# Patient Record
Sex: MALE | Race: WHITE | NOT HISPANIC OR LATINO | Employment: FULL TIME | URBAN - METROPOLITAN AREA
[De-identification: names, ages, dates, MRNs, and addresses within clinical notes are randomized per-mention and may not be internally consistent; named-entity substitution may affect disease eponyms.]

---

## 2017-03-08 DIAGNOSIS — K59.09 OTHER CONSTIPATION: Primary | ICD-10-CM

## 2017-03-08 RX ORDER — POLYETHYLENE GLYCOL 3350 17 G/17G
1 POWDER, FOR SOLUTION ORAL DAILY
Qty: 119 G | Refills: 11 | Status: SHIPPED | OUTPATIENT
Start: 2017-03-08 | End: 2017-08-08

## 2017-03-09 ENCOUNTER — OFFICE VISIT (OUTPATIENT)
Dept: ORTHOPEDICS | Facility: CLINIC | Age: 47
End: 2017-03-09

## 2017-03-09 VITALS
BODY MASS INDEX: 26.64 KG/M2 | SYSTOLIC BLOOD PRESSURE: 128 MMHG | DIASTOLIC BLOOD PRESSURE: 80 MMHG | WEIGHT: 190.3 LBS | HEIGHT: 71 IN

## 2017-03-09 DIAGNOSIS — M76.31 ILIOTIBIAL BAND TENDINITIS OF RIGHT SIDE: Primary | ICD-10-CM

## 2017-03-09 NOTE — MR AVS SNAPSHOT
"              After Visit Summary   3/9/2017    Pantera Haile    MRN: [id number]           Patient Information     Date Of Birth          1970        Visit Information        Provider Department      3/9/2017 2:00 PM Higinio Car MD [facility] Sports Medicine        Today's Diagnoses     Iliotibial band tendinitis of right side    -  1       Follow-ups after your visit        Who to contact     Please call your clinic at [phone] to:    Ask questions about your health    Make or cancel appointments    Discuss your medicines    Learn about your test results    Speak to your doctor   If you have compliments or concerns about an experience at your clinic, or if you wish to file a complaint, please contact [facility] Patient Relations at [phone] or email us at [email or web address]         Additional Information About Your Visit        MyChart Information     [employer] gives you secure access to your electronic health record. If you see a primary care provider, you can also send messages to your care team and make appointments. If you have questions, please call your primary care clinic.  If you do not have a primary care provider, please call [phone] and they will assist you.      [employer] is an electronic gateway that provides easy, online access to your medical records. With [employer], you can request a clinic appointment, read your test results, renew a prescription or communicate with your care team.     To access your existing account, please contact your [facility] or call [phone] for assistance.        Care EveryWhere ID     This is your Care EveryWhere ID. This could be used by other organizations to access your [place] medical records  [id number]        Your Vitals Were     Height BMI (Body Mass Index)                5' 11\" (1.803 m) 26.54 kg/m2           Blood Pressure from Last 3 Encounters:   03/09/17 128/80 "   11/03/16 107/66   09/26/16 117/77    Weight from Last 3 Encounters:   03/09/17 190 lb 4.8 oz (86.3 kg)   11/03/16 196 lb (88.9 kg)   09/26/16 193 lb (87.5 kg)              Today, you had the following     No orders found for display       Primary Care Provider Office Phone # Fax #    Minh Crook [phone]       [facility] [address] [place] [address]        Thank you!     Thank you for choosing [facility]  for your care. Our goal is always to provide you with excellent care. Hearing back from our patients is one way we can continue to improve our services. Please take a few minutes to complete the written survey that you may receive in the mail after your visit with us. Thank you!             Your Updated Medication List - Protect others around you: Learn how to safely use, store and throw away your medicines at www.disposemymeds.org.          This list is accurate as of: 3/9/17 11:59 PM.  Always use your most recent med list.                   Brand Name Dispense Instructions for use    [facility] DIGESTIVE HEALTH Caps     30 capsule    Take 1 capsule by mouth 2 times daily       MAALOX MAX PO          methylcellulose (laxative) Powd     850 g    Start with 1 heaping tablespoon. Increase as needed, 1 heaping tablespoon at a time, up to 3 times per day.       polyethylene glycol powder    MIRALAX/GLYCOLAX    119 g    Take 17 g (1 capful) by mouth daily       propranolol 10 MG tablet    INDERAL    10 tablet    Take one po qd prn for anxiety/tremor

## 2017-03-09 NOTE — PROGRESS NOTES
"Sports Medicine Clinic Visit    PCP: Minh Crook    Pantera Haile is a 46 year old male who is seen  in consultation for left knee pain. He reports 2-3 weeks ago he was running on a treadmill when he all of the sudden felt pain. The pain has been on and off since then.    Injury: 2 weeks ago    Location of Pain: right lower leg  Duration of Pain: 2 week(s)  Rating of Pain: 2/10  Pain is better with: Nothing  Pain is worse with: NA  Additional Features: NA  Treatment so far consists of: Nothing  Prior History of related problems:     /80  Ht 5' 11\" (1.803 m)  Wt 190 lb 4.8 oz (86.3 kg)  BMI 26.54 kg/m2         PMH:  Active problem list:  Patient Active Problem List   Diagnosis     Need for diphtheria-tetanus-pertussis (Tdap) vaccine, adult/adolescent     Adjustment disorder with anxiety       FH:  History reviewed. No pertinent family history.    SH:  Social History     Social History     Marital status: Unknown     Spouse name: N/A     Number of children: N/A     Years of education: N/A     Occupational History     Not on file.     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    MEDS:  See EMR, reviewed  ALL:  See EMR, reviewed    REVIEW OF SYSTEMS:  CONSTITUTIONAL:NEGATIVE for fever, chills, change in weight  INTEGUMENTARY/SKIN: NEGATIVE for worrisome rashes, moles or lesions  EYES: NEGATIVE for vision changes or irritation  ENT/MOUTH: NEGATIVE for ear, mouth and throat problems  RESP:NEGATIVE for significant cough or SOB  BREAST: NEGATIVE for masses, tenderness or discharge  CV: NEGATIVE for chest pain, palpitations or peripheral edema  GI: NEGATIVE for nausea, abdominal pain, heartburn, or change in bowel habits  :NEGATIVE for " frequency, dysuria, or hematuria  :NEGATIVE for frequency, dysuria, or hematuria  NEURO: NEGATIVE for weakness, dizziness or paresthesias  ENDOCRINE: NEGATIVE for temperature intolerance, skin/hair changes  HEME/ALLERGY/IMMUNE: NEGATIVE for bleeding problems  PSYCHIATRIC: NEGATIVE for changes in mood or affect                SUBJECTIVE:  This 46-year-old male 2 weeks ago was running on the treadmill at the [employer] noticed some discomfort he could feel in the lateral side of his right knee.  However, it quickly resolved and he was able to run an additional 5 minutes without any discomfort.  Since then, he has been able to return to running and pain is not always present but sometimes he will notice it with certain squatting and lunging positions.  He points to the lateral side of his knee.  The knee has not been swollen.  It does not lock or catch and he denies any previous issues with this knee.  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      OBJECTIVE:  The right knee reveals no effusion.  I can flex and extend the knee fully.  He is consistently nontender at the lateral joint line or the fibular head.  I can flex the knee into hyperflexion.  Caridad is negative.  Lachman is negative.  Nontender over the medial joint line or the pes anserine bursa.  He has some mild tenderness in the area of the distal IT band at certain angles of motion and he says this is consistent with the areas he has been noticing discomfort.  An Kavita test is negative.  He has improvements that could be made in 1- and 2-legged squat form.      ASSESSMENT:  Distal IT band tendinitis.      PLAN:  I discussed a return progression over the next 2-3 weeks of getting back to his athletic activities without bothering this.  Discuss localized ice massage, friction massage.  We substituted some of his lunging exercises he created on his own to some wall squats.  He was able to demonstrate  these adequately today.  He will look for improvements over the next 3 weeks and follow up if this is not the case.

## 2017-03-09 NOTE — LETTER
"  3/9/2017      RE: Pantera Haile  [address]  [facility] [address]       Sports Medicine Clinic Visit    PCP: Minh Crook    Pantera Haile is a 46 year old male who is seen  in consultation for left knee pain. He reports 2-3 weeks ago he was running on a treadmill when he all of the sudden felt pain. The pain has been on and off since then.    Injury: 2 weeks ago    Location of Pain: right lower leg  Duration of Pain: 2 week(s)  Rating of Pain: 2/10  Pain is better with: Nothing  Pain is worse with: NA  Additional Features: NA  Treatment so far consists of: Nothing  Prior History of related problems:     /80  Ht 5' 11\" (1.803 m)  Wt 190 lb 4.8 oz (86.3 kg)  BMI 26.54 kg/m2         PMH:  Active problem list:  Patient Active Problem List   Diagnosis     Need for diphtheria-tetanus-pertussis (Tdap) vaccine, adult/adolescent     Adjustment disorder with anxiety       FH:  History reviewed. No pertinent family history.    SH:  Social History     Social History     Marital status: Unknown     Spouse name: N/A     Number of children: N/A     Years of education: N/A     Occupational History     Not on file.     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    MEDS:  See EMR, reviewed  ALL:  See EMR, reviewed    REVIEW OF SYSTEMS:  CONSTITUTIONAL:NEGATIVE for fever, chills, change in weight  INTEGUMENTARY/SKIN: NEGATIVE for worrisome rashes, moles or lesions  EYES: NEGATIVE for vision changes or irritation  ENT/MOUTH: NEGATIVE for ear, mouth and throat problems  RESP:NEGATIVE for significant cough or SOB  BREAST: NEGATIVE for masses, tenderness or discharge  CV: NEGATIVE for chest pain, palpitations or peripheral edema  GI: " NEGATIVE for nausea, abdominal pain, heartburn, or change in bowel habits  :NEGATIVE for frequency, dysuria, or hematuria  :NEGATIVE for frequency, dysuria, or hematuria  NEURO: NEGATIVE for weakness, dizziness or paresthesias  ENDOCRINE: NEGATIVE for temperature intolerance, skin/hair changes  HEME/ALLERGY/IMMUNE: NEGATIVE for bleeding problems  PSYCHIATRIC: NEGATIVE for changes in mood or affect                SUBJECTIVE:  This 46-year-old male 2 weeks ago was running on the treadmill at the [employer] noticed some discomfort he could feel in the lateral side of his right knee.  However, it quickly resolved and he was able to run an additional 5 minutes without any discomfort.  Since then, he has been able to return to running and pain is not always present but sometimes he will notice it with certain squatting and lunging positions.  He points to the lateral side of his knee.  The knee has not been swollen.  It does not lock or catch and he denies any previous issues with this knee.  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      OBJECTIVE:  The right knee reveals no effusion.  I can flex and extend the knee fully.  He is consistently nontender at the lateral joint line or the fibular head.  I can flex the knee into hyperflexion.  Caridad is negative.  Lachman is negative.  Nontender over the medial joint line or the pes anserine bursa.  He has some mild tenderness in the area of the distal IT band at certain angles of motion and he says this is consistent with the areas he has been noticing discomfort.  An Kavita test is negative.  He has improvements that could be made in 1- and 2-legged squat form.      ASSESSMENT:  Distal IT band tendinitis.      PLAN:  I discussed a return progression over the next 2-3 weeks of getting back to his athletic activities without bothering this.  Discuss localized ice massage, friction massage.  We substituted some of  his lunging exercises he created on his own to some wall squats.  He was able to demonstrate these adequately today.  He will look for improvements over the next 3 weeks and follow up if this is not the case.       Higinio Car MD

## 2017-07-11 ENCOUNTER — TELEPHONE (OUTPATIENT)
Dept: INTERNAL MEDICINE | Facility: CLINIC | Age: 47
End: 2017-07-11

## 2017-07-11 DIAGNOSIS — L72.3 SEBACEOUS CYST: ICD-10-CM

## 2017-07-11 DIAGNOSIS — T78.40XA ALLERGY: Primary | ICD-10-CM

## 2017-07-12 RX ORDER — CETIRIZINE HYDROCHLORIDE 10 MG/1
10 TABLET ORAL DAILY
Qty: 90 TABLET | Refills: 3 | Status: SHIPPED | OUTPATIENT
Start: 2017-07-12 | End: 2017-08-08

## 2017-07-12 NOTE — TELEPHONE ENCOUNTER
Spoke with pt. 2 things.    1. Pt would like to try a different allergy medication rather than loratidine which is not helping any more -- Dr. Crook suggested Zyrte and pt agreed. I sent the Rx to [facility].    2. Pt would like to see a dermatologist at [facility] for sebaceous cyst. -- referral was faxed to [facility].

## 2017-08-08 ENCOUNTER — OFFICE VISIT (OUTPATIENT)
Dept: FAMILY MEDICINE | Facility: CLINIC | Age: 47
End: 2017-08-08

## 2017-08-08 VITALS
BODY MASS INDEX: 27.34 KG/M2 | WEIGHT: 195.3 LBS | RESPIRATION RATE: 16 BRPM | DIASTOLIC BLOOD PRESSURE: 75 MMHG | HEART RATE: 59 BPM | SYSTOLIC BLOOD PRESSURE: 112 MMHG | HEIGHT: 71 IN

## 2017-08-08 DIAGNOSIS — Z00.00 ROUTINE GENERAL MEDICAL EXAMINATION AT A HEALTH CARE FACILITY: Primary | ICD-10-CM

## 2017-08-08 ASSESSMENT — PAIN SCALES - GENERAL: PAINLEVEL: NO PAIN (0)

## 2017-08-08 NOTE — MR AVS SNAPSHOT
After Visit Summary   8/8/2017    Pantera Haile    MRN: [id number]           Patient Information     Date Of Birth          1970        Visit Information        Provider Department      8/8/2017 11:00 AM Minh Crook MD [facility]        Care Instructions    Primary Care Center Phone Number [phone]  [facility] Medication Refill Request Information:  * Please contact your pharmacy regarding ANY request for medication refills.  ** [facility] Prescription Fax = [phone]  * Please allow 3 business days for routine medication refills.  * Please allow 5 business days for controlled substance medication refills.     [facility] Test Result notification information:  *You will be notified with in 7-10 days of your appointment day regarding the results of your test.  If you are on MyChart you will be notified as soon as the provider has reviewed the results and signed off on them.                  Follow-ups after your visit        Who to contact     Please call your clinic at [phone] to:    Ask questions about your health    Make or cancel appointments    Discuss your medicines    Learn about your test results    Speak to your doctor   If you have compliments or concerns about an experience at your clinic, or if you wish to file a complaint, please contact [facility] Patient Relations at [phone] or email us at [email or web address]         Additional Information About Your Visit        MyChart Information     [employer] gives you secure access to your electronic health record. If you see a primary care provider, you can also send messages to your care team and make appointments. If you have questions, please call your primary care clinic.  If you do not have a primary care provider, please call [phone] and they will assist you.      [employer] is an electronic gateway that provides easy, online access to your  "medical records. With [employer], you can request a clinic appointment, read your test results, renew a prescription or communicate with your care team.     To access your existing account, please contact your [facility] or call [phone] for assistance.        Care EveryWhere ID     This is your Care EveryWhere ID. This could be used by other organizations to access your [place] medical records  [id number]        Your Vitals Were     Pulse Respirations Height BMI (Body Mass Index)          59 16 1.803 m (5' 11\") 27.24 kg/m2         Blood Pressure from Last 3 Encounters:   08/08/17 112/75   03/09/17 128/80   11/03/16 107/66    Weight from Last 3 Encounters:   08/08/17 88.6 kg (195 lb 4.8 oz)   03/09/17 86.3 kg (190 lb 4.8 oz)   11/03/16 88.9 kg (196 lb)              Today, you had the following     No orders found for display         Today's Medication Changes          These changes are accurate as of: 8/8/17 11:55 AM.  If you have any questions, ask your nurse or doctor.               Stop taking these medicines if you haven't already. Please contact your care team if you have questions.     cetirizine 10 MG tablet   Commonly known as:  zyrTEC           CULTURELLE DIGESTIVE HEALTH Caps           methylcellulose (laxative) Powd           polyethylene glycol powder   Commonly known as:  MIRALAX/GLYCOLAX           propranolol 10 MG tablet   Commonly known as:  INDERAL                    Primary Care Provider Office Phone # Fax #    Minh Crook [phone]       PRIMARY [facility]ARE CENTER [address] [place] [address]        Equal Access to Services     JORY RUDOLPH AH: Hadii andrew jones hadasho Sorenny, waaxda luqadaha, qaybta kaalmada ina rogers. So [facility] [phone].    ATENCIÓN: Si habla español, tiene a stallings disposición servicios gratuitos de asistencia lingüística. Llame al [phone].    We comply with " applicable federal civil rights laws and [place] laws. We do not discriminate on the basis of race, color, national origin, age, disability sex, sexual orientation or gender identity.            Thank you!     Thank you for choosing [facility]  for your care. Our goal is always to provide you with excellent care. Hearing back from our patients is one way we can continue to improve our services. Please take a few minutes to complete the written survey that you may receive in the mail after your visit with us. Thank you!             Your Updated Medication List - Protect others around you: Learn how to safely use, store and throw away your medicines at www.disposemymeds.org.      Notice  As of 8/8/2017 11:55 AM    You have not been prescribed any medications.

## 2017-08-08 NOTE — NURSING NOTE
Chief Complaint   Patient presents with     Physical     pt is here for an annual physical       Basia Sahu CMA at 11:01 AM on 8/8/2017

## 2017-08-08 NOTE — PATIENT INSTRUCTIONS
Primary Care Center Phone Number [phone]  Primary Care Center Medication Refill Request Information:  * Please contact your pharmacy regarding ANY request for medication refills.  ** [facility] Prescription Fax = [phone]  * Please allow 3 business days for routine medication refills.  * Please allow 5 business days for controlled substance medication refills.     Primary Care Center Test Result notification information:  *You will be notified with in 7-10 days of your appointment day regarding the results of your test.  If you are on MyChart you will be notified as soon as the provider has reviewed the results and signed off on them.

## 2017-08-20 NOTE — PROGRESS NOTES
"SUBJECTIVE:    Pt is a 47 year old male with pmh of     Patient Active Problem List   Diagnosis     Need for diphtheria-tetanus-pertussis (Tdap) vaccine, adult/adolescent     Adjustment disorder with anxiety       who is here for evaluation of had concerns including Physical.    Here for a physical. 47 feels well, works at [employer], white collar job, exercises, eats healthy.  w children.    No acute concerns.    Recently did outside labs for health fair, told good, will get us copy to scan    H/o surgery for hroid/fissue w/ fistula    Adjustment disorder/anxiety managed w/ help from Dr De Leon    No other sig history    FH: no significant history    No Known Allergies                        No current outpatient prescriptions on file.       Social History   Substance Use Topics     Smoking status: Never Smoker     Smokeless tobacco: Never Used     Alcohol use Yes      Comment: 3 drinks/week       Ten pt ROS completed, o/w neg  OBJECTIVE:  /75  Pulse 59  Resp 16  Ht 1.803 m (5' 11\")  Wt 88.6 kg (195 lb 4.8 oz)  BMI 27.24 kg/m2  GENERAL APPEARANCE: Alert, no acute distress  EYES: PERRL, EOM normal, conjunctiva and lids normal  HENT: Ears and TMs normal, oral mucosa and posterior oropharynx normal  NECK: No adenopathy,masses or thyromegaly  RESP: lungs clear to auscultation   CV: normal rate, regular rhythm, no murmur or gallop  ABDOMEN: soft, no organomegaly, masses or tenderness   (male): penis, scrotum, testes normal, no hernias  MS: extremities normal, no peripheral edema  NEURO: Alert, oriented, speech and mentation normal  PSYCHE: mentation appears normal, affect and mood normal    ASSESSMENT/PLAN:    Healthy 46 yo male, continue healthy diet/exercise, yearly physicals, he'll send over labs    AMBER WATSON MD      "

## 2017-10-06 ENCOUNTER — OFFICE VISIT (OUTPATIENT)
Dept: OPHTHALMOLOGY | Facility: CLINIC | Age: 47
End: 2017-10-06

## 2017-10-06 DIAGNOSIS — H35.82 RETINAL ISCHEMIA: ICD-10-CM

## 2017-10-06 DIAGNOSIS — H52.13 MYOPIA OF BOTH EYES: Primary | ICD-10-CM

## 2017-10-06 DIAGNOSIS — H35.89 RETINAL PIGMENT EPITHELIAL MOTTLING OF MACULA: ICD-10-CM

## 2017-10-06 ASSESSMENT — CUP TO DISC RATIO
OD_RATIO: 0.5
OS_RATIO: 0.5

## 2017-10-06 ASSESSMENT — TONOMETRY
IOP_METHOD: TONOPEN
OS_IOP_MMHG: 17
OD_IOP_MMHG: 16

## 2017-10-06 ASSESSMENT — REFRACTION_WEARINGRX
SPECS_TYPE: SVL
OS_CYLINDER: +0.50
OD_SPHERE: -2.75
OS_AXIS: 149
OS_SPHERE: -2.50
OD_AXIS: 047
OD_CYLINDER: +0.75

## 2017-10-06 ASSESSMENT — VISUAL ACUITY
OS_SC: J1+
METHOD: SNELLEN - LINEAR
OD_CC: 20/20
OD_SC: J1+
CORRECTION_TYPE: GLASSES
OS_CC: 20/20

## 2017-10-06 ASSESSMENT — REFRACTION_MANIFEST
OS_AXIS: 160
OD_CYLINDER: +0.75
OS_CYLINDER: +0.50
OS_SPHERE: -2.00
OD_AXIS: 036
OD_SPHERE: -2.25

## 2017-10-06 ASSESSMENT — CONF VISUAL FIELD
OD_NORMAL: 1
OS_NORMAL: 1
METHOD: COUNTING FINGERS

## 2017-10-06 ASSESSMENT — SLIT LAMP EXAM - LIDS
COMMENTS: NORMAL
COMMENTS: NORMAL

## 2017-10-06 ASSESSMENT — EXTERNAL EXAM - RIGHT EYE: OD_EXAM: NORMAL

## 2017-10-06 ASSESSMENT — EXTERNAL EXAM - LEFT EYE: OS_EXAM: NORMAL

## 2017-10-06 NOTE — NURSING NOTE
Chief Complaints and History of Present Illnesses   Patient presents with     Annual Eye Exam     HPI    Affected eye(s):  Both   Symptoms:     No blurred vision      Frequency:  Constant       Do you have eye pain now?:  No      Comments:  Patient states vision has been stable since last visit both eyes. Denies eye pain or irritation. Does not take eye drops.    Ruma Villegas COT 11:42 AM October 6, 2017

## 2017-10-06 NOTE — PROGRESS NOTES
Assessment/Plan  (H52.13) Myopia of both eyes  (primary encounter diagnosis)  Comment: Myopia OU, prefers to read without glasses  Plan: REFRACTION [01598]         Educated patient on condition and clinical findings. Dispensed spectacle prescription for distance only vision. Educated patient on possibility of adaptation period, if symptoms do not improve return to clinic for further testing.    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    (H35.82) Retinal ischemia  Comment: Small area of ischemia OS  Plan: Referred to Dr. Newton for evaluation.    Complete documentation of historical and exam elements from today's encounter can  be found in the full encounter summary report (not reduplicated in this progress  note). I personally obtained the chief complaint(s) and history of present illness. I  confirmed and edited as necessary the review of systems, past medical/surgical  history, family history, social history, and examination findings as documented by  others; and I examined the patient myself. I personally reviewed the relevant tests,  images, and reports as documented above. I formulated and edited as necessary the  assessment and plan and discussed the findings and management plan with the  patient and family.    Jordon Viveros, PAULETTE, FAAO

## 2017-10-06 NOTE — MR AVS SNAPSHOT
After Visit Summary   10/6/2017    Pantera Haile    MRN: [id number]           Patient Information     Date Of Birth          1970        Visit Information        Provider Department      10/6/2017 11:40 AM Jordon Viveros OD [facility]        Today's Diagnoses     Myopia of both eyes    -  1    Retinal pigment epithelial mottling of macula        Retinal ischemia           Follow-ups after your visit        Follow-up notes from your care team     Return in about 1 year (around 10/6/2018) for Comprehensive Eye Exam.      Your next 10 appointments already scheduled     Oct 09, 2017  3:15 PM CDT   NEW RETINA with Cindy Newton MD   Eye Clinic ([facility])    [address]  [address]  [address]  [place] [id number] [phone]              Who to contact     Please call your clinic at [phone] to:    Ask questions about your health    Make or cancel appointments    Discuss your medicines    Learn about your test results    Speak to your doctor   If you have compliments or concerns about an experience at your clinic, or if you wish to file a complaint, please contact [facility] Patient Relations at [phone] or email us at [email or web address]         Additional Information About Your Visit        MyChart Information     [employer] gives you secure access to your electronic health record. If you see a primary care provider, you can also send messages to your care team and make appointments. If you have questions, please call your primary care clinic.  If you do not have a primary care provider, please call [phone] and they will assist you.      [employer] is an electronic gateway that provides easy, online access to your medical records. With [employer], you can request a clinic appointment, read your test results, renew a prescription or communicate with your care team.     To access your  existing account, please contact your [facility] or call [phone] for assistance.        Care EveryWhere ID     This is your Care EveryWhere ID. This could be used by other organizations to access your [place] medical records  [id number]         Blood Pressure from Last 3 Encounters:   08/08/17 112/75   03/09/17 128/80   11/03/16 107/66    Weight from Last 3 Encounters:   08/08/17 88.6 kg (195 lb 4.8 oz)   03/09/17 86.3 kg (190 lb 4.8 oz)   11/03/16 88.9 kg (196 lb)              We Performed the Following     REFRACTION [[id number]]        Primary Care Provider Office Phone # Fax #    Minh Crook [phone]       [address] [address]        Equal Access to Services     JOON [facility] : Hadii andrew jones hadasho Sorenny, waaxda luqadaha, qaybta kaalmada adeegyada, ina abarca hayvinay jaimes . So [facility] [phone].    ATENCIÓN: Si habla español, tiene a stallings disposición servicios gratuitos de asistencia lingüística. Heather al [phone].    We comply with applicable federal civil rights laws and [place] laws. We do not discriminate on the basis of race, color, national origin, age, disability, sex, sexual orientation, or gender identity.            Thank you!     Thank you for choosing [facility]  for your care. Our goal is always to provide you with excellent care. Hearing back from our patients is one way we can continue to improve our services. Please take a few minutes to complete the written survey that you may receive in the mail after your visit with us. Thank you!             Your Updated Medication List - Protect others around you: Learn how to safely use, store and throw away your medicines at www.disposemymeds.org.      Notice  As of 10/6/2017  3:08 PM    You have not been prescribed any medications.

## 2017-10-12 ENCOUNTER — OFFICE VISIT (OUTPATIENT)
Dept: OPHTHALMOLOGY | Facility: CLINIC | Age: 47
End: 2017-10-12
Attending: OPHTHALMOLOGY
Payer: COMMERCIAL

## 2017-10-12 DIAGNOSIS — H35.89 RETINAL PIGMENT EPITHELIAL MOTTLING OF MACULA: Primary | ICD-10-CM

## 2017-10-12 PROCEDURE — 99213 OFFICE O/P EST LOW 20 MIN: CPT | Mod: ZF

## 2017-10-12 PROCEDURE — 92134 CPTRZ OPH DX IMG PST SGM RTA: CPT | Mod: ZF | Performed by: OPHTHALMOLOGY

## 2017-10-12 PROCEDURE — 92250 FUNDUS PHOTOGRAPHY W/I&R: CPT | Mod: ZF | Performed by: OPHTHALMOLOGY

## 2017-10-12 ASSESSMENT — SLIT LAMP EXAM - LIDS
COMMENTS: NORMAL
COMMENTS: NORMAL

## 2017-10-12 ASSESSMENT — VISUAL ACUITY
CORRECTION_TYPE: GLASSES
OD_CC: 20/20
METHOD: SNELLEN - LINEAR
OS_CC: 20/20

## 2017-10-12 ASSESSMENT — CUP TO DISC RATIO
OS_RATIO: 0.5
OD_RATIO: 0.5

## 2017-10-12 ASSESSMENT — TONOMETRY
OD_IOP_MMHG: 15
OS_IOP_MMHG: 15
IOP_METHOD: TONOPEN

## 2017-10-12 ASSESSMENT — EXTERNAL EXAM - LEFT EYE: OS_EXAM: NORMAL

## 2017-10-12 ASSESSMENT — EXTERNAL EXAM - RIGHT EYE: OD_EXAM: NORMAL

## 2017-10-12 NOTE — PROGRESS NOTES
I have confirmed the patient's and reviewed Past Medical History, Past Surgical History, Social History, Family History, Problem List, Medication List and agree with Tech note.    CC: consult Dr viveros    HPI: patient seen last week, Retinal pigment epithelium changes both eyes     Assessment/plan:   1.  Retinal pigment epithelium mottling right eye    - OCT and fundus autofluorescence made as baseline    2.  Prominent retinal nerve fiber layer left eye    - Informed patient , monitor       RTC 2-3 years in retina, with Jesus Manuel Viveros OD in the meantime      Cindy Taylor MD PhD.  Professor & Chair

## 2017-10-12 NOTE — NURSING NOTE
Chief Complaints and History of Present Illnesses   Patient presents with     New Patient     Retinal ischemia     HPI    Affected eye(s):  Both   Symptoms:     No blurred vision   No decreased vision   No floaters   No flashes         Do you have eye pain now?:  No      Comments:  New patient is here for retinal evaluation.  FARZAD Vanessa 9:25 AM 10/12/2017

## 2017-10-12 NOTE — MR AVS SNAPSHOT
After Visit Summary   10/12/2017    Pantera Haile    MRN: [id number]           Patient Information     Date Of Birth          1970        Visit Information        Provider Department      10/12/2017 9:15 AM Cindy Newton MD [facility]        Today's Diagnoses     Retinal pigment epithelial mottling of macula    -  1       Follow-ups after your visit        Follow-up notes from your care team     Return if symptoms worsen or fail to improve.      Who to contact     Please call your clinic at [phone] to:    Ask questions about your health    Make or cancel appointments    Discuss your medicines    Learn about your test results    Speak to your doctor   If you have compliments or concerns about an experience at your clinic, or if you wish to file a complaint, please contact [facility] Patient Relations at [phone] or email us at [email or web address]         Additional Information About Your Visit        MyChart Information     [employer] gives you secure access to your electronic health record. If you see a primary care provider, you can also send messages to your care team and make appointments. If you have questions, please call your primary care clinic.  If you do not have a primary care provider, please call [phone] and they will assist you.      [employer] is an electronic gateway that provides easy, online access to your medical records. With [employer], you can request a clinic appointment, read your test results, renew a prescription or communicate with your care team.     To access your existing account, please contact your [facility] or call [phone] for assistance.        Care EveryWhere ID     This is your Care EveryWhere ID. This could be used by other organizations to access your [place] medical records  [id number]         Blood Pressure from Last 3 Encounters:   08/08/17 112/75    03/09/17 128/80   11/03/16 107/66    Weight from Last 3 Encounters:   08/08/17 88.6 kg (195 lb 4.8 oz)   03/09/17 86.3 kg (190 lb 4.8 oz)   11/03/16 88.9 kg (196 lb)              We Performed the Following     Fundus Autofluorescence Image (FAF) OU (both eyes)     Multicolor Image OU (both eyes)     OCT Retina Spectralis OU (both eyes)        Primary Care Provider Office Phone # Fax #    Minh Crook [phone]       [address]  [facility] [address]        Equal Access to Services     JORY RUDOLPH : Hadii andrew Marshall, wamoise medrano, preston kaalmada sue, ina coyle. So [facility] [phone].    ATENCIÓN: Si habla español, tiene a stallings disposición servicios gratuitos de asistencia lingüística. Llame al [phone].    We comply with applicable federal civil rights laws and [place] laws. We do not discriminate on the basis of race, color, national origin, age, disability, sex, sexual orientation, or gender identity.            Thank you!     Thank you for choosing EYE CLINIC  for your care. Our goal is always to provide you with excellent care. Hearing back from our patients is one way we can continue to improve our services. Please take a few minutes to complete the written survey that you may receive in the mail after your visit with us. Thank you!             Your Updated Medication List - Protect others around you: Learn how to safely use, store and throw away your medicines at www.disposemymeds.org.      Notice  As of 10/12/2017 10:25 AM    You have not been prescribed any medications.

## 2017-10-12 NOTE — Clinical Note
Retinal pigment epithelium changes right eye, not visually significant.  Left eye has very prominent retinal nerve fiber layer

## 2018-04-25 ENCOUNTER — RADIANT APPOINTMENT (OUTPATIENT)
Dept: GENERAL RADIOLOGY | Facility: CLINIC | Age: 48
End: 2018-04-25
Attending: FAMILY MEDICINE
Payer: COMMERCIAL

## 2018-04-25 ENCOUNTER — OFFICE VISIT (OUTPATIENT)
Dept: FAMILY MEDICINE | Facility: CLINIC | Age: 48
End: 2018-04-25
Payer: COMMERCIAL

## 2018-04-25 VITALS — HEART RATE: 56 BPM | RESPIRATION RATE: 16 BRPM | SYSTOLIC BLOOD PRESSURE: 117 MMHG | DIASTOLIC BLOOD PRESSURE: 77 MMHG

## 2018-04-25 DIAGNOSIS — M54.50 BILATERAL LOW BACK PAIN WITHOUT SCIATICA, UNSPECIFIED CHRONICITY: Primary | ICD-10-CM

## 2018-04-25 DIAGNOSIS — M54.50 BILATERAL LOW BACK PAIN WITHOUT SCIATICA, UNSPECIFIED CHRONICITY: ICD-10-CM

## 2018-04-25 RX ORDER — CETIRIZINE HYDROCHLORIDE 10 MG/1
10 TABLET ORAL DAILY
COMMUNITY

## 2018-04-25 NOTE — NURSING NOTE
Chief Complaint   Patient presents with     Back Pain     No injury noted.        Basia Sahu CMA at 10:37 AM on 4/25/2018

## 2018-04-25 NOTE — MR AVS SNAPSHOT
After Visit Summary   4/25/2018    Pantera Haile    MRN: [id number]           Patient Information     Date Of Birth          1970        Visit Information        Provider Department      4/25/2018 10:35 AM Minh Crook MD [facility]        Today's Diagnoses     Bilateral low back pain without sciatica, unspecified chronicity    -  1      Care Instructions    Primary Care Center Phone Number [phone]  Primary Care Center Medication Refill Request Information:  * Please contact your pharmacy regarding ANY request for medication refills.  ** [facility] Prescription Fax = [phone]  * Please allow 3 business days for routine medication refills.  * Please allow 5 business days for controlled substance medication refills.     Primary Care Center Test Result notification information:  *You will be notified with in 7-10 days of your appointment day regarding the results of your test.  If you are on MyChart you will be notified as soon as the provider has reviewed the results and signed off on them.    Please go to the x-ray on the first floor before you leave today.                     Follow-ups after your visit        Your next 10 appointments already scheduled     Apr 25, 2018  2:20 PM CDT   (Arrive by 2:05 PM)   XR LUMBAR SPINE G/E 4 VIEWS with UCXR1   [facility] ([facility])    [address] [place] [id number] [phone]           Please bring a list of your current medicines to your exam. (Include vitamins, minerals and over-thecounter medicines.) Leave your valuables at home.  Tell your doctor if there is a chance you may be pregnant.  You do not need to do anything special for this exam.            Apr 25, 2018  2:50 PM CDT   XR SACROILIAC JOINT 1/2 VIEWS with UCXR4   [facility] ([facility])    [address] [address]    [phone]           Please bring a list of your current medicines to your exam. (Include vitamins, minerals and over-thecounter medicines.) Leave your valuables at home.  Tell your doctor if there is a chance you may be pregnant.  You do not need to do anything special for this exam.              Future tests that were ordered for you today     Open Future Orders        Priority Expected Expires Ordered    XR Lumbar Spine G/E 4 Views Routine 4/25/2018 4/25/2019 4/25/2018    XR Sacroiliac Joint 1/2 Views Routine 4/25/2018 4/25/2019 4/25/2018            Who to contact     Please call your clinic at [phone] to:    Ask questions about your health    Make or cancel appointments    Discuss your medicines    Learn about your test results    Speak to your doctor            Additional Information About Your Visit        [employer] Information     [employer] gives you secure access to your electronic health record. If you see a primary care provider, you can also send messages to your care team and make appointments. If you have questions, please call your primary care clinic.  If you do not have a primary care provider, please call [phone] and they will assist you.      [employer] is an electronic gateway that provides easy, online access to your medical records. With [employer], you can request a clinic appointment, read your test results, renew a prescription or communicate with your care team.     To access your existing account, please contact your [facility] or call [phone] for assistance.        Care EveryWhere ID     This is your Care EveryWhere ID. This could be used by other organizations to access your [place] medical records  [id number]        Your Vitals Were     Pulse Respirations                56 16           Blood Pressure from Last 3 Encounters:   04/25/18 117/77   08/08/17 112/75   03/09/17 128/80    Weight from Last 3 Encounters:   08/08/17 88.6 kg (195 lb 4.8 oz)    03/09/17 86.3 kg (190 lb 4.8 oz)   11/03/16 88.9 kg (196 lb)               Primary Care Provider Office Phone # Fax #    Minh Crook [phone]       [address] [address]        Equal Access to Services     JOON RUDOLPH : Hadii aad ku hadasho Soomaali, waaxda luqadaha, qaybta kaalmada adeegyada, waxay idiin hayaan adeeg kharash la'aan ah. So [facility] [phone].    ATENCIÓN: Si habla español, tiene a stalilngs disposición servicios gratuitos de asistencia lingüística. Llame al [phone].    We comply with applicable federal civil rights laws and [place] laws. We do not discriminate on the basis of race, color, national origin, age, disability, sex, sexual orientation, or gender identity.            Thank you!     Thank you for choosing [facility] PRIMARY CARE CLINIC  for your care. Our goal is always to provide you with excellent care. Hearing back from our patients is one way we can continue to improve our services. Please take a few minutes to complete the written survey that you may receive in the mail after your visit with us. Thank you!             Your Updated Medication List - Protect others around you: Learn how to safely use, store and throw away your medicines at www.disposemymeds.org.          This list is accurate as of 4/25/18 11:24 AM.  Always use your most recent med list.                   Brand Name Dispense Instructions for use Diagnosis    cetirizine 10 MG tablet    zyrTEC     Take 10 mg by mouth daily

## 2018-04-25 NOTE — PROGRESS NOTES
[facility]  Primary Care Center   Minh Crook MD  04/25/2018      Chief Complaint:   Back Pain       History of Present Illness:   Pantera Haile is a 48 year old male with an unremarkable medical history who presents for evaluation of right sided low back pain. When he stands or bends forward he gets back pain. He denies any trauma. Walking, sitting, running and laying in bed do not hurt. Pain does not radiate to his legs. He has no bowel or urinary issues. Backwards bending hurts. He has not tried heat, ice, or NSAIDs.     Review of Systems:   Pertinent items are noted in HPI, remainder of complete ROS is negative.      Active Medications:     Current Outpatient Prescriptions:      cetirizine (ZYRTEC) 10 MG tablet, Take 10 mg by mouth daily, Disp: , Rfl:       Allergies:   Review of patient's allergies indicates no known allergies.      Past Medical History:  Diagnosis     Adjustment disorder with anxiety     Past Surgical History:  Procedure Laterality Date     COLONOSCOPY WITH CO2 INSUFFLATION N/A 12/30/2014     EXAM UNDER ANESTHESIA, EXCISE LESION RECTUM, COMBINED N/A 12/30/2014     FISTULOTOMY RECTUM N/A 12/30/2014     Social History:   Non smoker     Physical Exam:   /77  Pulse 56  Resp 16   Constitutional: Alert. In no distress.  Head: Normocephalic. No masses, lesions, tenderness or abnormalities.  ENT: No neck nodes or sinus tenderness.  Cardiovascular: RRR. No murmurs, clicks, gallops, or rub.  Respiratory: Clear to auscultation bilaterally, no wheezes or crackles.  Gastrointestinal: Abdomen soft. Non-tender. BS normal. No masses or organomegaly.  Musculoskeletal: Back:  Mildly tender over right SI and jean pierre lumbar muscles., otherwise back not tender. Back neurological and vascular status are intact. Full ROM except flexion limited to half normal by pain Straight leg testnegative   Extremities normal. No gross deformities noted. Normal muscle tone.  Skin: No suspicious lesions. No  rashes.  Neurologic: Gait normal. Reflexes normal and symmetric. Sensation grossly WNL.  Psychiatric: Mentation appears normal. Normal affect.     Assessment and Plan:  Bilateral low back pain without sciatica, unspecified chronicity  400mg ibuprofen TID and try ice and heat. Call next week if these have not relieved pain, in which case PT or sports medicine referral will be considered.  - XR Lumbar Spine G/E 4 Views  - XR Sacroiliac Joint 1/2 Views        Follow-up: Data Unavailable         Scribe Disclosure:   I, Dontrell Roth, am serving as a scribe to document services personally performed by Minh Crook MD at this visit, based upon the provider's statements to me. All documentation has been reviewed by the aforementioned provider prior to being entered into the official medical record.     Portions of this medical record were completed by a scribe. UPON MY REVIEW AND AUTHENTICATION BY ELECTRONIC SIGNATURE, this confirms (a) I performed the applicable clinical services, and (b) the record is accurate.   Minh Crook MD

## 2018-04-25 NOTE — PATIENT INSTRUCTIONS
Primary Care Center Phone Number [phone]  Primary Care Center Medication Refill Request Information:  * Please contact your pharmacy regarding ANY request for medication refills.  ** [facility] Prescription Fax = [phone]  * Please allow 3 business days for routine medication refills.  * Please allow 5 business days for controlled substance medication refills.     [facility] Test Result notification information:  *You will be notified with in 7-10 days of your appointment day regarding the results of your test.  If you are on MyChart you will be notified as soon as the provider has reviewed the results and signed off on them.    Please go to the x-ray on the first floor before you leave today.

## 2019-08-22 ENCOUNTER — OFFICE VISIT (OUTPATIENT)
Dept: FAMILY MEDICINE | Facility: CLINIC | Age: 49
End: 2019-08-22
Payer: COMMERCIAL

## 2019-08-22 VITALS
BODY MASS INDEX: 29.01 KG/M2 | WEIGHT: 208 LBS | SYSTOLIC BLOOD PRESSURE: 113 MMHG | HEART RATE: 68 BPM | OXYGEN SATURATION: 98 % | DIASTOLIC BLOOD PRESSURE: 71 MMHG

## 2019-08-22 DIAGNOSIS — R07.89 CHEST WALL PAIN: Primary | ICD-10-CM

## 2019-08-22 ASSESSMENT — PAIN SCALES - GENERAL: PAINLEVEL: NO PAIN (0)

## 2019-08-22 NOTE — PROGRESS NOTES
[facility]  Primary Care Center   Minh Crook MD  08/22/2019      Chief Complaint:   Pain     History of Present Illness:   Pantera Haile is a 49 year old male with a history of adjustment disorder with anxiety who presents for evaluation of pain.     Left-Sided Pain   About a month ago, he has noticed that he feels pain in his left chest and back when he takes a deep breathe. He noticed it a few days after going to the gym and is not sure if he over-exerted himself. His typical gym routine is running for half an hour, using weight machines for another half hour, and then running again for another 9 minutes. Once in a while, he has localized and central chest pain when running. There is no pain when sitting, coughing, sneezing, or laughing. He traveled to [facility] prior to the pain starting, but it did not occur until several days afterwards. He is a nonsmoker. Denies any recent rash.     Review of Systems:   Pertinent items are noted in HPI or as in patient entered ROS below, remainder of complete ROS is negative.     Active Medications:      cetirizine (ZYRTEC) 10 MG tablet, Take 10 mg by mouth daily, Disp: , Rfl:       Allergies:   Patient has no known allergies.      Past Medical History:  Adjustment disorder with anxiety      Past Surgical History:  Colonoscopy with CO2 insufflation   Excise lesion rectum   Fistulotomy rectum     Family History:    History reviewed. No pertinent family history.       Social History:   The patient was alone.   Smoking Status: Never smoker    Smokeless Tobacco: Never used   Marital Status:   Employment Status: Works at the [employer]  Alcohol Use: Yes       Physical Exam:   /71 (BP Location: Right arm, Patient Position: Sitting, Cuff Size: Adult Large)   Pulse 68   Wt 94.3 kg (208 lb)   SpO2 98%   BMI 29.01 kg/m       Constitutional: Alert. In no distress.  Head: The scalp, face, and head appear normal.  Cardiovascular: RRR. No murmurs, clicks, gallops, or  rub.  Respiratory: Clear to auscultation bilaterally, no wheezes or crackles.  Musculoskeletal: Extremities appear normal. No gross deformities noted. No tenderness of chest wall, no pain with movement of shoulder or trunk, no rashes noted.   Neurologic: Gait normal. Speech is normal and fluent.  Psychiatric: Mentation appears normal. Normal affect.     Assessment and Plan:    Chest wall pain with inspiration   This is very focal in the mid left lateral and posterior chest, only with a very big breath or occasionally with repositioning in bed. He can work out, both doing weights and jogging, without exacerbation or feeling of pain. Suggested he try Advil and let me know in September if it still hurts and we can consider revaluating. No cough, no shortness of breath, no leg swelling, no fever.     Health Maintenance   We did look at health maintenance issues. 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 He will give me copy of those. He does not need a colonoscopy for another 5 years.Shots up to date, except shingles - I asked him to check with his druggist.     Weight  Patient is concerned that he tries to live healthy lifestyle, but has slowly been putting on wieght in his middle ages. He feels well with no fatigue. We discussed trying to eat a little less and to keep up exercise.     Follow-up: PRN      Scribe Disclosure:  I, Bindu Gutierres, am serving as a scribe to document services personally performed by Minh Crook MD at this visit, based upon the provider's statements to me. All documentation has been reviewed by the aforementioned provider prior to being entered into the official medical record.     Portions of this medical record were completed by a scribe. UPON MY REVIEW AND AUTHENTICATION BY ELECTRONIC SIGNATURE, this confirms (a) I performed the applicable clinical services, and (b) the record is accurate.   Minh Crook MD MD

## 2019-08-22 NOTE — NURSING NOTE
Chief Complaint   Patient presents with     Pain     pain in the left side of chest going into the back with deep inhalation- x8 days per pt        Reba Renteria LPN, EMT at 2:32 PM on 8/22/2019.

## 2019-08-22 NOTE — PATIENT INSTRUCTIONS
[facility] Medication Refill Request Information:  * Please contact your pharmacy regarding ANY request for medication refills.  ** [facility] Prescription Fax = [phone]  * Please allow 3 business days for routine medication refills.  * Please allow 5 business days for controlled substance medication refills.     [facility] Test Result notification information:  *You will be notified with in 7-10 days of your appointment day regarding the results of your test.  If you are on MyChart you will be notified as soon as the provider has reviewed the results and signed off on them.    [facility]: [phone]

## 2019-08-28 ENCOUNTER — MYC MEDICAL ADVICE (OUTPATIENT)
Dept: FAMILY MEDICINE | Facility: CLINIC | Age: 49
End: 2019-08-28

## 2019-10-02 ENCOUNTER — HEALTH MAINTENANCE LETTER (OUTPATIENT)
Age: 49
End: 2019-10-02

## 2019-10-23 ENCOUNTER — MYC MEDICAL ADVICE (OUTPATIENT)
Dept: FAMILY MEDICINE | Facility: CLINIC | Age: 49
End: 2019-10-23

## 2020-02-19 ENCOUNTER — OFFICE VISIT (OUTPATIENT)
Dept: FAMILY MEDICINE | Facility: CLINIC | Age: 50
End: 2020-02-19
Payer: COMMERCIAL

## 2020-02-19 VITALS
TEMPERATURE: 98.2 F | RESPIRATION RATE: 18 BRPM | BODY MASS INDEX: 28.14 KG/M2 | HEIGHT: 71 IN | HEART RATE: 77 BPM | DIASTOLIC BLOOD PRESSURE: 78 MMHG | SYSTOLIC BLOOD PRESSURE: 115 MMHG | OXYGEN SATURATION: 98 % | WEIGHT: 201 LBS

## 2020-02-19 DIAGNOSIS — R05.9 COUGH: Primary | ICD-10-CM

## 2020-02-19 LAB
FLUAV+FLUBV AG SPEC QL: NEGATIVE
FLUAV+FLUBV AG SPEC QL: NEGATIVE
SPECIMEN SOURCE: NORMAL

## 2020-02-19 ASSESSMENT — MIFFLIN-ST. JEOR: SCORE: 1785.92

## 2020-02-19 ASSESSMENT — PAIN SCALES - GENERAL: PAINLEVEL: NO PAIN (0)

## 2020-02-19 NOTE — PATIENT INSTRUCTIONS
Primary Care Center Medication Refill Request Information:  * Please contact your pharmacy regarding ANY request for medication refills.  ** [facility] Prescription Fax = [phone]  * Please allow 3 business days for routine medication refills.  * Please allow 5 business days for controlled substance medication refills.     Primary Care Center Test Result notification information:  *You will be notified with in 7-10 days of your appointment day regarding the results of your test.  If you are on MyChart you will be notified as soon as the provider has reviewed the results and signed off on them.

## 2020-02-19 NOTE — NURSING NOTE
Chief Complaint   Patient presents with     Flu     pt. states that he has the flu. pt. states that he has been having flu symptoms since Friday evening        Jacquelyn Ogden, EMT

## 2020-02-19 NOTE — PROGRESS NOTES
"SUBJECTIVE:    Pt is a 49 year old male with pmh of     Patient Active Problem List   Diagnosis     Need for diphtheria-tetanus-pertussis (Tdap) vaccine, adult/adolescent     Adjustment disorder with anxiety       who is here for evaluation of had concerns including Flu (pt. states that he has the flu. pt. states that he has been having flu symptoms since Friday evening ).      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, [employer] office job    No Known Allergies            Current Outpatient Medications   Medication Sig Dispense Refill     cetirizine (ZYRTEC) 10 MG tablet Take 10 mg by mouth daily         Social History     Tobacco Use     Smoking status: Never Smoker     Smokeless tobacco: Never Used   Substance Use Topics     Alcohol use: Yes     Comment: 3 drinks/week     Drug use: No           OBJECTIVE:  /78 (BP Location: Right arm, Patient Position: Right side, Cuff Size: Adult Large)   Pulse 77   Temp 98.2  F (36.8  C) (Oral)   Resp 18   Ht 1.791 m (5' 10.5\")   Wt 91.2 kg (201 lb)   SpO2 98%   BMI 28.43 kg/m    GENERAL APPEARANCE: Alert, no acute distress  EYES: PERRL, EOM normal, conjunctiva and lids normal  HENT: Ears and TMs normal, oral mucosa and posterior oropharynx normal  NECK: No adenopathy,masses or thyromegaly  RESP: lungs clear to auscultation   CV: normal rate, regular rhythm, no murmur or gallop  MS: extremities normal, no peripheral edema  NEURO: Alert, oriented, speech and mentation normal  PSYCHE: mentation appears normal, affect and mood normal  He is hoarse    ASSESSMENT/PLAN:    URI:  Likely viral  Discussed secondary infection symptoms to watch for    He plans to start getting fall flu shots, free at work, has been skipping        AMBER WATSON MD      "

## 2020-02-26 NOTE — PROGRESS NOTES
[facility]   Vanessa Dueñas, MARGY CNP  2/27/2020     Chief Complaint:   Imm/Inj (pt here for travel vaccines)     History of Present Illness:   Pantera Haile is a 49 year old year old male seen today for what was originally supposed to be a travel vaccine visit, however he and his wife have now decided to not visit [place] in April 2020, given the current coronavirus outbreak in [place]. He would like his immunizations reviewed and given travel advice to [facility] if they were to decide to go in the future. He would have flown into [facility] and traveled north in [facility], eventually ending up in [facility]. It would have been a trip for pleasure, not business. He was planning to travel with his wife so they would be leaving behind children and if they were detained due to Coronavirus they would have childcare issues, etc.    Immunizations:  Immunization History   Administered Date(s) Administered     Influenza (H1N1) 02/10/2010     Influenza (IIV3) PF 11/29/2006     TD (ADULT, 7+) 09/29/2004     TDAP Vaccine (Boostrix) 08/01/2012     Zoster vaccine recombinant adjuvanted (SHINGRIX) 10/25/2019     Zoster vaccine, live 08/23/2019     He notes he got both Shingrix vaccines at [facility] (2 months apart), although only his first shot is recorded.     Review of Systems:   Pertinent items are noted in HPI.  All other systems are negative.    LMP/GYN history: No LMP for male patient.      Active Medications:      cetirizine (ZYRTEC) 10 MG tablet, Take 10 mg by mouth daily, Disp: , Rfl:       Allergies:   Patient has no known allergies.      Past Medical History:  Adjustment disorder with anxiety     Past Surgical History:  Colonoscopy with CO2 insufflation   Excise lesion rectum   Fistulotomy rectum     Family History:    History reviewed. No pertinent family history.        Social History:   The patient was alone.   Smoking Status: Never smoker    Smokeless Tobacco: Never used   Marital Status:    Employment Status: Works at the [employer], office job  Alcohol Use: Yes      Physical Exam:   /81 (BP Location: Right arm, Patient Position: Sitting, Cuff Size: Adult Regular)   Pulse 77   Wt 91.6 kg (202 lb)   SpO2 98%   BMI 28.57 kg/m       General: well-nourished, well-developed male in no acute distress.    Assessment and Plan:   Pantera Haile 49 year old old male whom I had the pleasure of seeing today for counseling for international travel.    Counseling on vaccinations for travel:   If he was to travel to [place], I would have recommended a booster Tetanus vaccine (last given in 2013), Typhoid, and Hepatitis A vaccine. He was provided with written prescription for oral typhoid prevention to keep on hand, as he is planning on future international travel within the next 5 years. If he decides to travel, I could order the vaccines for him here at [facility] or he could go to his community pharmacy. Provided with current information on coronavirus outbreak.     Counseling for travel  -     TYPHOID VACCINE, ORAL    Counseling on malaria and other insect borne diseases  Prescription for malaria: N/A, not traveling to affected areas.     We also discussed general precautions for mosquito, other insect borne and ecto-parasite avoidance.    Counseling on traveler's diarrhea  We discussed precautions for clean water and food preparation as follows:  Recommended bottled or boiled water, including ice, and for tooth brushing.  Peel it, boil it, cook it, or forget it  Gave handout on traveler's diarrhea.  Handwashing or use of sanitizers several times daily and prior to eating  We discussed medications for management of traveler's diarrhea, once symptomatic:  Gave handout to patient.  Recommended going to clinic, if dehydrated or if high fever and/or blood in stool.  Recommended loperamide (Imodium, an antimotility agent), for diarrhea without fever    Counseling on the epidemiology of travel related morbidity and  mortality  Discussed precautions for accident avoidance.  Discussed flying: drinking plenty of fluids and exercising legs every 2 hours  Discussed health concerns overseas.  Gave copy of CDC recommendations.  Discussed safety and security:     Other counseling for travel  Recommended checking medicine cabinet - looking for any meds normally used at home for common illnesses.  Provided education regarding sun exposure and use of high SPF sunscreen.  Discussed avoidance of blood and body secretions.   High altitude sickness: not an issue.  Motion sickness.  Jet lag: Not an issue.  HIV Post Exposure : aware    Creating a Medical Travel Kit  Every busy traveler should have a kit containing personal care and medical items that can be easily tossed into a suitcase.  Quantities can be adjusted for the length of travel, for destinations, and availability of replacement at your destination.    Plan to see your Travel medicine Clinic 4-6 weeks prior to departure as some vaccines require time to become effective.     Items to Pack:  -Basic antibiotics  -Mild laxatives: Metamucil or Senokot, available without a prescription.  -Anti-diarrheal medication: Imodium AD or Pepto Bismol, available in tablet form without a prescription.  -Medical thermometer  -Antacid tablets  -Band aides, guaze bandages, ace wrap, adhesive tape, antibiotic ointment  -Topical ointment or creams for rash or bites (over the counter hydrocortisone cream).    -Motion sickness: Dramamine or Antivert (Meclazine)tablets  -Aspirin, acetaminophen(tylenol) ibuprofen  -Cough medicine or drops  -Anti-fungal foot powder  -Nasal spray or decongestants  -Eyeglasses.  Take an extra pair or copy of your prescription.  -Insect repellent with 30% DEET time release, such as Ultrathon or Hopper products. Available at [facility]  -Permethrin clothing insecticide treatment.Available at [facility]  -Ear  Plugs  -Sunscreen  -Sunglasses, Hat  -Safety pins, toilet paper, money belt, padlock, duct tape    Regular Medications should be left in original containers and carried in your carry on bag. Take enough with you for the entire trip. You might need a letter to give to your insurance to obtain a sufficient amount for your trip.    Carry with you the following info: Known allergies, medical conditions, medications, name of your insurance company and policy number, blood type if known, and phone numbers for emergency contact.    Medical resources  Medical Alert [facility]  [phone]  Provides Medical Alert Tags    [facility] [phone] (9 am TO 5 pm EST)  [phone] (after hours)  Deals with emergencies relating to passport problems.    [facility] Department [facility]  [phone]( Mon-Fri 8:30-10pm EST)  [phone] Emergencies only, 24 hour facilitator)  [phone] ( travel advisories recording) Provides information on current health conditions around the world.    Follow-Up Plan:   Please see after visit summary for details regarding the patient's planned vaccination schedule.  For any labs ordered, we will contact the patient with a plan for further care.  The patient was also encouraged to be seen for any post-travel illness, or with future travel plans.    The total time spent during this visit for individual counseling was 25 minutes, all of which was spent in counseling time, including reviewing the past medical and vaccination history, the travel itinerary, the risks of travel and suggested precautions, and the recommended vaccines and medicines with their side effects -- all for upcoming travel.     Scribe Disclosure:   Bindu CRYSTAL, am serving as a scribe to document services personally performed by MARGY Lyon CNP at this visit, based upon the provider's statements to me. All documentation has been reviewed by the aforementioned  provider prior to being entered into the official medical record.     Portions of this medical record were completed by a scribe. UPON MY REVIEW AND AUTHENTICATION BY ELECTRONIC SIGNATURE, this confirms (a) I performed the applicable clinical services, and (b) the record is accurate.

## 2020-02-27 ENCOUNTER — OFFICE VISIT (OUTPATIENT)
Dept: INTERNAL MEDICINE | Facility: CLINIC | Age: 50
End: 2020-02-27
Payer: COMMERCIAL

## 2020-02-27 VITALS
OXYGEN SATURATION: 98 % | SYSTOLIC BLOOD PRESSURE: 124 MMHG | BODY MASS INDEX: 28.57 KG/M2 | HEART RATE: 77 BPM | WEIGHT: 202 LBS | DIASTOLIC BLOOD PRESSURE: 81 MMHG

## 2020-02-27 DIAGNOSIS — Z71.84 COUNSELING FOR TRAVEL: Primary | ICD-10-CM

## 2020-02-27 ASSESSMENT — PAIN SCALES - GENERAL: PAINLEVEL: NO PAIN (0)

## 2020-02-27 NOTE — NURSING NOTE
Chief Complaint   Patient presents with     Imm/Inj     pt here for travel vaccines       Justin Michael CMA, EMT at 2:13 PM on 2/27/2020.

## 2020-02-27 NOTE — PATIENT INSTRUCTIONS
[facility] Medication Refill Request Information:  * Please contact your pharmacy regarding ANY request for medication refills.  ** [facility] Prescription Fax = [phone]  * Please allow 3 business days for routine medication refills.  * Please allow 5 business days for controlled substance medication refills.     [facility] Test Result notification information:  *You will be notified with in 7-10 days of your appointment day regarding the results of your test.  If you are on MyChart you will be notified as soon as the provider has reviewed the results and signed off on them.    [facility]: [phone]

## 2020-11-18 ENCOUNTER — OFFICE VISIT (OUTPATIENT)
Dept: FAMILY MEDICINE | Facility: CLINIC | Age: 50
End: 2020-11-18
Payer: COMMERCIAL

## 2020-11-18 VITALS
BODY MASS INDEX: 29.32 KG/M2 | DIASTOLIC BLOOD PRESSURE: 73 MMHG | HEIGHT: 70 IN | HEART RATE: 86 BPM | OXYGEN SATURATION: 99 % | TEMPERATURE: 98 F | WEIGHT: 204.8 LBS | SYSTOLIC BLOOD PRESSURE: 113 MMHG

## 2020-11-18 DIAGNOSIS — Z12.5 SCREENING FOR PROSTATE CANCER: ICD-10-CM

## 2020-11-18 DIAGNOSIS — Z13.1 SCREENING FOR DIABETES MELLITUS: ICD-10-CM

## 2020-11-18 DIAGNOSIS — E55.9 VITAMIN D DEFICIENCY: ICD-10-CM

## 2020-11-18 DIAGNOSIS — R53.83 OTHER FATIGUE: ICD-10-CM

## 2020-11-18 DIAGNOSIS — Z00.00 ANNUAL PHYSICAL EXAM: Primary | ICD-10-CM

## 2020-11-18 DIAGNOSIS — Z11.4 ENCOUNTER FOR SCREENING FOR HIV: ICD-10-CM

## 2020-11-18 DIAGNOSIS — R68.82 DECREASED LIBIDO: ICD-10-CM

## 2020-11-18 DIAGNOSIS — Z13.6 SCREENING FOR HEART DISEASE: ICD-10-CM

## 2020-11-18 LAB
CHOLEST SERPL-MCNC: 198 MG/DL
ESTRADIOL SERPL-MCNC: 31 PG/ML (ref 6–50)
FERRITIN SERPL-MCNC: 130 NG/ML (ref 26–388)
GLUCOSE SERPL-MCNC: 98 MG/DL (ref 70–99)
HDLC SERPL-MCNC: 76 MG/DL
IRON SATN MFR SERPL: 32 % (ref 15–46)
IRON SERPL-MCNC: 93 UG/DL (ref 35–180)
LDLC SERPL CALC-MCNC: 109 MG/DL
NONHDLC SERPL-MCNC: 122 MG/DL
PSA SERPL-ACNC: 0.49 UG/L (ref 0–4)
TIBC SERPL-MCNC: 291 UG/DL (ref 240–430)
TRIGL SERPL-MCNC: 62 MG/DL
TSH SERPL DL<=0.005 MIU/L-ACNC: 0.81 MU/L (ref 0.4–4)

## 2020-11-18 PROCEDURE — 36415 COLL VENOUS BLD VENIPUNCTURE: CPT | Performed by: PATHOLOGY

## 2020-11-18 PROCEDURE — 80061 LIPID PANEL: CPT | Performed by: PATHOLOGY

## 2020-11-18 PROCEDURE — 99000 SPECIMEN HANDLING OFFICE-LAB: CPT | Performed by: PATHOLOGY

## 2020-11-18 PROCEDURE — G0103 PSA SCREENING: HCPCS | Performed by: PATHOLOGY

## 2020-11-18 PROCEDURE — 99396 PREV VISIT EST AGE 40-64: CPT | Performed by: NURSE PRACTITIONER

## 2020-11-18 PROCEDURE — 82947 ASSAY GLUCOSE BLOOD QUANT: CPT | Performed by: PATHOLOGY

## 2020-11-18 PROCEDURE — 83540 ASSAY OF IRON: CPT | Performed by: PATHOLOGY

## 2020-11-18 PROCEDURE — 84443 ASSAY THYROID STIM HORMONE: CPT | Performed by: PATHOLOGY

## 2020-11-18 PROCEDURE — 82728 ASSAY OF FERRITIN: CPT | Performed by: PATHOLOGY

## 2020-11-18 PROCEDURE — 83550 IRON BINDING TEST: CPT | Performed by: PATHOLOGY

## 2020-11-18 ASSESSMENT — ANXIETY QUESTIONNAIRES
GAD7 TOTAL SCORE: 0
1. FEELING NERVOUS, ANXIOUS, OR ON EDGE: NOT AT ALL
2. NOT BEING ABLE TO STOP OR CONTROL WORRYING: NOT AT ALL
6. BECOMING EASILY ANNOYED OR IRRITABLE: NOT AT ALL
5. BEING SO RESTLESS THAT IT IS HARD TO SIT STILL: NOT AT ALL
IF YOU CHECKED OFF ANY PROBLEMS ON THIS QUESTIONNAIRE, HOW DIFFICULT HAVE THESE PROBLEMS MADE IT FOR YOU TO DO YOUR WORK, TAKE CARE OF THINGS AT HOME, OR GET ALONG WITH OTHER PEOPLE: NOT DIFFICULT AT ALL
3. WORRYING TOO MUCH ABOUT DIFFERENT THINGS: NOT AT ALL
7. FEELING AFRAID AS IF SOMETHING AWFUL MIGHT HAPPEN: NOT AT ALL

## 2020-11-18 ASSESSMENT — PATIENT HEALTH QUESTIONNAIRE - PHQ9
5. POOR APPETITE OR OVEREATING: NOT AT ALL
SUM OF ALL RESPONSES TO PHQ QUESTIONS 1-9: 0

## 2020-11-18 ASSESSMENT — MIFFLIN-ST. JEOR: SCORE: 1791.47

## 2020-11-18 ASSESSMENT — PAIN SCALES - GENERAL: PAINLEVEL: NO PAIN (0)

## 2020-11-18 NOTE — PROGRESS NOTES
Male Physical Note          HPI   Concerns today: Fatigue and low libido.    Patient Active Problem List   Diagnosis     Need for diphtheria-tetanus-pertussis (Tdap) vaccine, adult/adolescent     Adjustment disorder with anxiety       No past medical history on file.    Previous Medical Care     Family History   Problem Relation Age of Onset     Glaucoma No family hx of      Macular Degeneration No family hx of             Review of Systems:     Review of Systems:  CONSTITUTIONAL: NEGATIVE for fever, chills, change in weight  INTEGUMENTARY/SKIN: NEGATIVE for worrisome rashes, moles or lesions  EYES: NEGATIVE for vision changes or irritation  ENT/MOUTH: NEGATIVE for ear, mouth and throat problems  RESP: NEGATIVE for significant cough or SOB  BREAST: NEGATIVE for masses, tenderness or discharge  CV: NEGATIVE for chest pain, palpitations or peripheral edema  GI: NEGATIVE for nausea, abdominal pain, heartburn, or change in bowel habits  : NEGATIVE for frequency, dysuria, or hematuria  MUSCULOSKELETAL: NEGATIVE for significant arthralgias or myalgia  NEURO: NEGATIVE for weakness, dizziness or paresthesias  ENDOCRINE: NEGATIVE for temperature intolerance, skin/hair changes  HEME/ALLERGY: NEGATIVE for bleeding problems  PSYCHIATRIC: NEGATIVE for changes in mood or affect  Sleep:   Do you snore most or the night (as reported by a family member)? No  Do you feel sleepy or extremely tired during most of the day? No       Social History     Social History     Socioeconomic History     Marital status: Unknown     Spouse name: Not on file     Number of children: Not on file     Years of education: Not on file     Highest education level: Not on file   Occupational History     Not on file   Social Needs     Financial resource strain: Not on file     Food insecurity     Worry: Not on file     Inability: Not on file     Transportation needs     Medical: Not on file     Non-medical: Not on file   Tobacco Use     Smoking status:  "Never Smoker     Smokeless tobacco: Never Used   Substance and Sexual Activity     Alcohol use: Yes     Comment: 3 drinks/week     Drug use: No     Sexual activity: Not on file   Lifestyle     Physical activity     Days per week: Not on file     Minutes per session: Not on file     Stress: Not on file   Relationships     Social connections     Talks on phone: Not on file     Gets together: Not on file     Attends [employer] service: Not on file     Active member of club or organization: Not on file     Attends meetings of clubs or organizations: Not on file     Relationship status: Not on file     Intimate partner violence     Fear of current or ex partner: Not on file     Emotionally abused: Not on file     Physically abused: Not on file     Forced sexual activity: Not on file   Other Topics Concern     Not on file   Social History Narrative     Not on file       Marital Status:  Who lives in your household? Wife and 2 kids, 12 and 9 and 1/2     Has anyone hurt you physically, for example by pushing, hitting, slapping or kicking you or forcing you to have sex? Denies  Do you feel threatened or controlled by a partner, ex-partner or anyone in your life? Denies    Sexual Health     Sexual concerns: Yes-Decreased libido  STI History: Neg    Recommended Screening   Colonoscopy current; labs are pending       Physical Exam:     Vitals: /73   Pulse 86   Temp 98  F (36.7  C) (Axillary)   Ht 1.78 m (5' 10.08\")   SpO2 99%   BMI 28.92 kg/m    BMI= Body mass index is 28.92 kg/m .  GENERAL: healthy, alert and no distress  EYES: Eyes grossly normal to inspection, extraocular movements - intact, and PERRL  HENT: ear canals- normal; TMs- normal; Nose- normal; Mouth- no ulcers, no lesions  NECK: no tenderness, no adenopathy, no asymmetry, no masses, no stiffness; thyroid- normal to palpation  RESP: lungs clear to auscultation - no rales, no rhonchi, no wheezes  BREAST: no masses, no tenderness, no nipple " discharge, no palpable axillary masses or adenopathy  CV: regular rates and rhythm, normal S1 S2, no S3 or S4 and no murmur, no click or rub -  ABDOMEN: soft, no tenderness, no  hepatosplenomegaly, no masses, normal bowel sounds  MS: extremities- no gross deformities noted, no edema  SKIN: no suspicious lesions, no rashes  NEURO: strength and tone- normal, sensory exam- grossly normal, mentation- intact, speech- normal, reflexes- symmetric  BACK: no CVA tenderness, no paralumbar tenderness  PSYCH: Alert and oriented times 3; speech- coherent , normal rate and volume; able to articulate logical thoughts, able to abstract reason, no tangential thoughts, no hallucinations or delusions, affect- normal  LYMPHATICS: ant. cervical- normal, post. cervical- normal, axillary- normal, supraclavicular- normal, inguinal- normal    Assessment and Plan      Pantera was seen today for physical.    Diagnoses and all orders for this visit:    Annual physical exam    Other fatigue  -     Testosterone Free and Total; Future  -     Estradiol; Future  -     TSH; Future  -     Ferritin; Future  -     Iron and iron binding capacity; Future    Decreased libido  -     Testosterone Free and Total; Future  -     Estradiol; Future  -     TSH; Future    Screening for diabetes mellitus  -     Glucose; Future    Screening for heart disease  -     Lipid Profile FASTING; Future    Vitamin D deficiency  -     Vitamin D Deficiency; Future    Screening for prostate cancer  -     PSA screen; Future    Encounter for screening for HIV  -     HIV Antigen Antibody Combo; Future        1. Health Care Maintenance: Normal Physical Exam    PLAN:  1.Lipid panel, Blood glucose ordered.  2. Refused flu shot  3. Will return for review of abnormal lab results.     Options for treatment and follow-up care were reviewed with the patient. Pantera Servinko and/or guardian engaged in the decision making process and verbalized understanding of the options discussed and  agreed with the final plan.  MARGY Gonsalez, CNP

## 2020-11-18 NOTE — NURSING NOTE
Chief Complaint   Patient presents with     Physical     Pt is here for a physical.     Renetta Fish, RMA 2:07 PM  11/18/2020

## 2020-11-19 LAB
DEPRECATED CALCIDIOL+CALCIFEROL SERPL-MC: 106 UG/L (ref 20–75)
HIV 1+2 AB+HIV1 P24 AG SERPL QL IA: NONREACTIVE

## 2020-11-19 ASSESSMENT — ANXIETY QUESTIONNAIRES: GAD7 TOTAL SCORE: 0

## 2020-11-20 LAB
SHBG SERPL-SCNC: 42 NMOL/L (ref 11–80)
TESTOST FREE SERPL-MCNC: 8.8 NG/DL (ref 4.7–24.4)
TESTOST SERPL-MCNC: 490 NG/DL (ref 240–950)

## 2020-11-20 NOTE — PATIENT INSTRUCTIONS

## 2021-01-15 ENCOUNTER — HEALTH MAINTENANCE LETTER (OUTPATIENT)
Age: 51
End: 2021-01-15

## 2021-02-23 ENCOUNTER — VIRTUAL VISIT (OUTPATIENT)
Dept: FAMILY MEDICINE | Facility: CLINIC | Age: 51
End: 2021-02-23
Payer: COMMERCIAL

## 2021-02-23 DIAGNOSIS — R73.09 ELEVATED GLUCOSE LEVEL: ICD-10-CM

## 2021-02-23 DIAGNOSIS — R79.89 LOW TESTOSTERONE IN MALE: Primary | ICD-10-CM

## 2021-02-23 DIAGNOSIS — E67.8 MULTIPLE VITAMIN EXCESS DISEASE: ICD-10-CM

## 2021-02-23 DIAGNOSIS — R35.1 NOCTURIA: ICD-10-CM

## 2021-02-23 DIAGNOSIS — R53.83 OTHER FATIGUE: ICD-10-CM

## 2021-02-23 DIAGNOSIS — Z13.6 SCREENING FOR HEART DISEASE: ICD-10-CM

## 2021-02-23 PROCEDURE — 99214 OFFICE O/P EST MOD 30 MIN: CPT | Mod: TEL | Performed by: NURSE PRACTITIONER

## 2021-02-23 RX ORDER — OXYBUTYNIN CHLORIDE 5 MG/1
5 TABLET ORAL DAILY
Qty: 30 TABLET | Refills: 3 | Status: SHIPPED | OUTPATIENT
Start: 2021-02-23 | End: 2022-03-09

## 2021-02-23 ASSESSMENT — PAIN SCALES - GENERAL: PAINLEVEL: NO PAIN (0)

## 2021-02-23 NOTE — PROGRESS NOTES
Pantera is a 50 year old who is being evaluated via a billable telephone visit.      What phone number would you like to be contacted at? [phone]   How would you like to obtain your AVS? Latonia      Jose A Mott is a 50 year old who presents for the following health issues  HPI     Low testosterone: Pantera has made diet changes, he has lost about 10 lbs. He is also more active. He started using the Testosterone cream and he really likes this. He feels that his erections are stronger, he has more stamina and more energy in general.     Review of Systems   Constitutional, HEENT, cardiovascular, pulmonary, gi and gu systems are negative, except as otherwise noted.      Objective    Vitals - Patient Reported  Pain Score: No Pain (0)    Physical Exam   Healthy, alert and no distress  PSYCH: Alert and oriented times 3; coherent speech, normal   rate and volume, able to articulate logical thoughts, able   to abstract reason, no tangential thoughts, no hallucinations   or delusions  His affect is normal  RESP: No cough, no audible wheezing, able to talk in full sentences  Remainder of exam unable to be completed due to telephone visits    Recent lab results:   Component      Latest Ref Rng & Units 11/18/2020   Cholesterol      <200 mg/dL 198   Triglycerides      <150 mg/dL 62   HDL Cholesterol      >39 mg/dL 76   LDL Cholesterol Calculated      <100 mg/dL 109 (H)   Non HDL Cholesterol      <130 mg/dL 122   Iron      35 - 180 ug/dL 93   Iron Binding Cap      240 - 430 ug/dL 291   Iron Saturation Index      15 - 46 % 32   Testosterone Total      240 - 950 ng/dL 490   Sex Hormone Binding Globulin      11 - 80 nmol/L 42   Free Testosterone Calculated      4.7 - 24.4 ng/dL 8.80   HIV Antigen Antibody Combo      NR:Nonreactive     Nonreactive   PSA      0 - 4 ug/L 0.49   Ferritin      26 - 388 ng/mL 130   Vitamin D Deficiency screening      20 - 75 ug/L 106 (H)   TSH      0.40 - 4.00 mU/L 0.81   Estradiol      6 - 50  pg/mL 31   Glucose      70 - 99 mg/dL 98     Phone call duration: 15 minutes  Assessment & Plan     Low testosterone in male  Other fatigue  Elevated glucose level  Excess vitamin D  Nocturia    Return in about 3 months (around 5/23/2021) for using a video visit.    First, Pantera will continue with his healthy eating habits. Next, he will continue on his Testosterone cream. Next, he will have his labs checked in 3 months. Next, he will try Oxybutinin for overactive bladder at night. Finally, he will follow up on results once they are available. He verbalizes understanding of the plan.    Lou Almonte, APRN, CNP

## 2021-02-23 NOTE — NURSING NOTE
50 year old  Chief Complaint   Patient presents with     Recheck Medication     Follow up on medication.       Renetta Fish, JULIA  February 23, 2021 10:15 AM

## 2021-02-23 NOTE — PATIENT INSTRUCTIONS
[facility] Medication Refill Request Information:  * Please contact your pharmacy regarding ANY request for medication refills.  ** [facility] Prescription Fax = [phone]  * Please allow 3 business days for routine medication refills.  * Please allow 5 business days for controlled substance medication refills.     [facility] Test Result notification information:  *You will be notified with in 7-10 days of your appointment day regarding the results of your test.  If you are on MyChart you will be notified as soon as the provider has reviewed the results and signed off on them.    [facility]: [phone]     If you have questions regarding Covid-19 and the Covid-19 vaccine, please visit this website.    [email or web address]

## 2021-05-04 DIAGNOSIS — R79.89 LOW TESTOSTERONE IN MALE: ICD-10-CM

## 2021-05-04 DIAGNOSIS — Z13.6 SCREENING FOR HEART DISEASE: ICD-10-CM

## 2021-05-04 DIAGNOSIS — E67.8 MULTIPLE VITAMIN EXCESS DISEASE: ICD-10-CM

## 2021-05-04 DIAGNOSIS — R53.83 OTHER FATIGUE: ICD-10-CM

## 2021-05-04 DIAGNOSIS — R73.09 ELEVATED GLUCOSE LEVEL: ICD-10-CM

## 2021-05-04 LAB
CHOLEST SERPL-MCNC: 209 MG/DL
ESTRADIOL SERPL-MCNC: 22 PG/ML (ref 6–50)
GLUCOSE SERPL-MCNC: 103 MG/DL (ref 70–99)
HDLC SERPL-MCNC: 87 MG/DL
LDLC SERPL CALC-MCNC: 109 MG/DL
NONHDLC SERPL-MCNC: 122 MG/DL
TRIGL SERPL-MCNC: 61 MG/DL

## 2021-05-04 PROCEDURE — 82670 ASSAY OF TOTAL ESTRADIOL: CPT | Mod: 90 | Performed by: PATHOLOGY

## 2021-05-04 PROCEDURE — 80061 LIPID PANEL: CPT | Performed by: PATHOLOGY

## 2021-05-04 PROCEDURE — 99000 SPECIMEN HANDLING OFFICE-LAB: CPT | Performed by: PATHOLOGY

## 2021-05-04 PROCEDURE — 84403 ASSAY OF TOTAL TESTOSTERONE: CPT | Mod: 90 | Performed by: PATHOLOGY

## 2021-05-04 PROCEDURE — 84270 ASSAY OF SEX HORMONE GLOBUL: CPT | Mod: 90 | Performed by: PATHOLOGY

## 2021-05-04 PROCEDURE — 36415 COLL VENOUS BLD VENIPUNCTURE: CPT | Performed by: PATHOLOGY

## 2021-05-04 PROCEDURE — 82947 ASSAY GLUCOSE BLOOD QUANT: CPT | Performed by: PATHOLOGY

## 2021-05-04 PROCEDURE — 82306 VITAMIN D 25 HYDROXY: CPT | Mod: 90 | Performed by: PATHOLOGY

## 2021-05-05 LAB — DEPRECATED CALCIDIOL+CALCIFEROL SERPL-MC: 76 UG/L (ref 20–75)

## 2021-05-06 LAB
SHBG SERPL-SCNC: 46 NMOL/L (ref 11–80)
TESTOST FREE SERPL-MCNC: 6.45 NG/DL (ref 4.7–24.4)
TESTOST SERPL-MCNC: 394 NG/DL (ref 240–950)

## 2021-05-11 ENCOUNTER — VIRTUAL VISIT (OUTPATIENT)
Dept: FAMILY MEDICINE | Facility: CLINIC | Age: 51
End: 2021-05-11
Payer: COMMERCIAL

## 2021-05-11 DIAGNOSIS — R53.83 OTHER FATIGUE: ICD-10-CM

## 2021-05-11 DIAGNOSIS — R73.09 ELEVATED GLUCOSE LEVEL: ICD-10-CM

## 2021-05-11 DIAGNOSIS — R79.89 LOW TESTOSTERONE IN MALE: Primary | ICD-10-CM

## 2021-05-11 PROCEDURE — 99212 OFFICE O/P EST SF 10 MIN: CPT | Mod: TEL | Performed by: NURSE PRACTITIONER

## 2021-05-11 ASSESSMENT — PAIN SCALES - GENERAL: PAINLEVEL: NO PAIN (0)

## 2021-05-11 NOTE — PROGRESS NOTES
Pantera is a 51 year old who is being evaluated via a billable telephone visit.      What phone number would you like to be contacted at? [phone]   How would you like to obtain your AVS? Latonia    Jose A Mott is a 51 year old who presents for follow up on multiple items.     HPI     Low Testosterone in male: current level is 394 which is down from last check, however he feels well, sexual performance has improved.   Other Fatigue: he feels less fatigued, no concerns with this.   Elevated glucose level: Glucose has increased from last check, however he had this checked in the afternoon this time. Overall, blood glucose levels are wnl.     Review of Systems   Constitutional, HEENT, cardiovascular, pulmonary, gi and gu systems are negative, except as otherwise noted.      Objective    Vitals - Patient Reported  Pain Score: No Pain (0)    Physical Exam   Healthy, alert and no distress  PSYCH: Alert and oriented times 3; coherent speech, normal   rate and volume, able to articulate logical thoughts, able   to abstract reason, no tangential thoughts, no hallucinations   or delusions  His affect is normal  RESP: No cough, no audible wheezing, able to talk in full sentences  Remainder of exam unable to be completed due to telephone visits    Component      Latest Ref Rng & Units 5/4/2021   Cholesterol      <200 mg/dL 209 (H)   Triglycerides      <150 mg/dL 61   HDL Cholesterol      >39 mg/dL 87   LDL Cholesterol Calculated      <100 mg/dL 109 (H)   Non HDL Cholesterol      <130 mg/dL 122   Testosterone Total      240 - 950 ng/dL 394   Sex Hormone Binding Globulin      11 - 80 nmol/L 46   Free Testosterone Calculated      4.7 - 24.4 ng/dL 6.45   Estradiol      6 - 50 pg/mL 22   Vitamin D Deficiency screening      20 - 75 ug/L 76 (H)   Glucose      70 - 99 mg/dL 103 (H)     Phone call duration: 10 minutes    Assessment & Plan     Low testosterone in male      Other fatigue      Elevated glucose  level        Return in about 6 months (around 11/11/2021).    Overall, lab work is stable. No specific concerns. Pantera will continue on Testosterone supplementation. He will take Vit d in supplementation daily in the winter. Finally, he will return after labs in 6-12 months. He verbalizes understanding of the plan.   MARGY Gonsalez, CNP

## 2021-05-11 NOTE — PATIENT INSTRUCTIONS
[facility] Medication Refill Request Information:  * Please contact your pharmacy regarding ANY request for medication refills.  ** [facility] Prescription Fax = [phone]  * Please allow 3 business days for routine medication refills.  * Please allow 5 business days for controlled substance medication refills.     [facility] Test Result notification information:  *You will be notified with in 7-10 days of your appointment day regarding the results of your test.  If you are on MyChart you will be notified as soon as the provider has reviewed the results and signed off on them.    [facility]: [phone]     If you have questions regarding Covid-19 and the Covid-19 vaccine, please visit this website.    [email or web address]

## 2021-05-27 ENCOUNTER — OFFICE VISIT (OUTPATIENT)
Dept: OPHTHALMOLOGY | Facility: CLINIC | Age: 51
End: 2021-05-27
Payer: COMMERCIAL

## 2021-05-27 DIAGNOSIS — H52.13 MYOPIA OF BOTH EYES: Primary | ICD-10-CM

## 2021-05-27 DIAGNOSIS — H35.3112 INTERMEDIATE STAGE NONEXUDATIVE AGE-RELATED MACULAR DEGENERATION OF RIGHT EYE: ICD-10-CM

## 2021-05-27 PROCEDURE — 92004 COMPRE OPH EXAM NEW PT 1/>: CPT | Performed by: OPTOMETRIST

## 2021-05-27 PROCEDURE — 92134 CPTRZ OPH DX IMG PST SGM RTA: CPT | Performed by: OPTOMETRIST

## 2021-05-27 RX ORDER — TESTOSTERONE MICRONIZED 100 %
POWDER (GRAM) MISCELLANEOUS
COMMUNITY
Start: 2021-05-18 | End: 2022-03-09

## 2021-05-27 ASSESSMENT — VISUAL ACUITY
OS_CC+: -1
OS_CC: 20/20
CORRECTION_TYPE: GLASSES
METHOD: SNELLEN - LINEAR
OD_CC: 20/20

## 2021-05-27 ASSESSMENT — REFRACTION_MANIFEST
OD_SPHERE: -2.25
OS_ADD: +1.75
OS_SPHERE: -2.25
OD_ADD: +1.75
OS_AXIS: 163
OD_AXIS: 047
OS_CYLINDER: +0.50
OD_CYLINDER: +0.75

## 2021-05-27 ASSESSMENT — REFRACTION_WEARINGRX
OD_SPHERE: -2.25
OS_AXIS: 160
OD_CYLINDER: +0.75
OS_CYLINDER: +0.50
OD_AXIS: 036
OS_SPHERE: -2.00
SPECS_TYPE: SVL

## 2021-05-27 ASSESSMENT — CUP TO DISC RATIO
OD_RATIO: 0.5
OS_RATIO: 0.5

## 2021-05-27 ASSESSMENT — CONF VISUAL FIELD
OD_NORMAL: 1
METHOD: COUNTING FINGERS
OS_NORMAL: 1

## 2021-05-27 ASSESSMENT — TONOMETRY
IOP_METHOD: ICARE
OS_IOP_MMHG: 19
OD_IOP_MMHG: 20

## 2021-05-27 ASSESSMENT — SLIT LAMP EXAM - LIDS
COMMENTS: NORMAL
COMMENTS: NORMAL

## 2021-05-27 ASSESSMENT — EXTERNAL EXAM - LEFT EYE: OS_EXAM: NORMAL

## 2021-05-27 ASSESSMENT — EXTERNAL EXAM - RIGHT EYE: OD_EXAM: NORMAL

## 2021-05-27 NOTE — NURSING NOTE
Chief Complaints and History of Present Illnesses   Patient presents with     COMPREHENSIVE EYE EXAM     Chief Complaint(s) and History of Present Illness(es)     COMPREHENSIVE EYE EXAM     Laterality: both eyes    Onset: years ago    Frequency: constantly    Course: stable    Associated symptoms: Negative for floaters and flashes    Treatments tried: no treatments    Pain scale: 0/10              Comments     Annual exam. Pt states vision is stable. No visual concerns. No pain. No drops.    KACEY Rios COT 2:04 PM May 27, 2021

## 2021-05-27 NOTE — PROGRESS NOTES
History  HPI     COMPREHENSIVE EYE EXAM     In both eyes.  This started years ago.  Occurring constantly.  Since onset it is stable.  Associated symptoms include Negative for floaters and flashes.  Treatments tried include no treatments.  Pain was noted as 0/10.              Comments     Annual exam. Pt states vision is stable. No visual concerns. No pain. No drops.    KACEY Rios COT 2:04 PM May 27, 2021             Last edited by Amalia Nelson COT on 5/27/2021  2:04 PM. (History)          Assessment/Plan  (H52.13) Myopia of both eyes  (primary encounter diagnosis)  Comment: Myopia with presbyopia both eyes   Plan:  Educated patient on condition and clinical findings. Dispensed spectacle prescription for full time wear or distance only and remove for reading. Monitor annually.    (H35.7523) Intermediate stage nonexudative age-related macular degeneration of right eye  Comment: Several intermediate drusen right eye, few drusen left eye   Plan: OCT Retina Spectralis OU (both eyes)         Recommended AREDS 2 twice each day both eyes. Monitor annually.    Return to clinic in 1 year for comprehensive eye exam.    Complete documentation of historical and exam elements from today's encounter can  be found in the full encounter summary report (not reduplicated in this progress  note). I personally obtained the chief complaint(s) and history of present illness. I  confirmed and edited as necessary the review of systems, past medical/surgical  history, family history, social history, and examination findings as documented by  others; and I examined the patient myself. I personally reviewed the relevant tests,  images, and reports as documented above. I formulated and edited as necessary the  assessment and plan and discussed the findings and management plan with the  patient and family.    Jordon Viveros, OD, FAAO

## 2021-09-04 ENCOUNTER — HEALTH MAINTENANCE LETTER (OUTPATIENT)
Age: 51
End: 2021-09-04

## 2021-12-25 ENCOUNTER — HEALTH MAINTENANCE LETTER (OUTPATIENT)
Age: 51
End: 2021-12-25

## 2021-12-28 ENCOUNTER — VIRTUAL VISIT (OUTPATIENT)
Dept: FAMILY MEDICINE | Facility: CLINIC | Age: 51
End: 2021-12-28
Payer: COMMERCIAL

## 2021-12-28 DIAGNOSIS — Z13.1 SCREENING FOR DIABETES MELLITUS: ICD-10-CM

## 2021-12-28 DIAGNOSIS — R68.82 LOW LIBIDO: ICD-10-CM

## 2021-12-28 DIAGNOSIS — Z12.5 SCREENING FOR PROSTATE CANCER: ICD-10-CM

## 2021-12-28 DIAGNOSIS — R79.89 LOW TESTOSTERONE IN MALE: Primary | ICD-10-CM

## 2021-12-28 DIAGNOSIS — Z13.220 SCREENING FOR HYPERLIPIDEMIA: ICD-10-CM

## 2021-12-28 PROCEDURE — 99213 OFFICE O/P EST LOW 20 MIN: CPT | Mod: 95 | Performed by: NURSE PRACTITIONER

## 2021-12-28 NOTE — PROGRESS NOTES
Pantera is a 51 year old who is being evaluated via a billable video visit.      How would you like to obtain your AVS? Angel  If the video visit is dropped, the invitation should be resent by: Text to cell phone: angel  Will anyone else be joining your video visit? No      Video Start Time: 12:30 PM    Assessment & Plan     Low testosterone in male  - Testosterone Free and Total  - CBC with platelets differential  - PSA screen  - COMPOUNDED NON-CONTROLLED SUBSTANCE (CMPD RX) - PHARMACY TO MIX COMPOUNDED MEDICATION  Dispense: 30 g; Refill: 0    Screening for hyperlipidemia  - Lipid panel reflex to direct LDL Fasting  - Comprehensive metabolic panel    Screening for prostate cancer  - PSA screen    Screening for diabetes mellitus  - Comprehensive metabolic panel    Low libido  - Testosterone Free and Total  - COMPOUNDED NON-CONTROLLED SUBSTANCE (CMPD RX) - PHARMACY TO MIX COMPOUNDED MEDICATION  Dispense: 30 g; Refill: 0      20 minutes spent on the date of the encounter doing chart review, history and exam, documentation and further activities per the note       See Patient Instructions  Fasting lab work ordered  Follow-up in 6 months, sooner if needed  Testosterone cream will be refilled since this is working well for patient  Return to clinic if no improvement or symptoms worsen.  Patient verbalized understanding & agreed with plan of care.    MARGY Mallory, CNP  [facility] [place]    Jose A Mott is a 51 year old who presents for the following health issues  accompanied by himself -     HPI   Patient is former patient of Lou Almonte NP.  He has a history of low testosterone level. He has been using testosterone cream twice weekly and states this helped his low libido.  His fatigue has improved. His last testosterone level was 394 8 month ago. He declines any other question or concerns.  He would like fasting lab work drawn.      Review of Systems    Constitutional, HEENT, cardiovascular, pulmonary, gi and gu systems are negative, except as otherwise noted.      Objective           Vitals:  No vitals were obtained today due to virtual visit.    Physical Exam   GENERAL: Healthy, alert and no distress  EYES: Eyes grossly normal to inspection.  No discharge or erythema, or obvious scleral/conjunctival abnormalities.  RESP: No audible wheeze, cough, or visible cyanosis.  No visible retractions or increased work of breathing.    SKIN: Visible skin clear. No significant rash, abnormal pigmentation or lesions.  NEURO: Cranial nerves grossly intact.  Mentation and speech appropriate for age.  PSYCH: Mentation appears normal, affect normal/bright, judgement and insight intact, normal speech and appearance well-groomed.    Orders Only on 05/04/2021   Component Date Value Ref Range Status     Estradiol 05/04/2021 22  6 - 50 pg/mL Final     Testosterone Total 05/04/2021 394  240 - 950 ng/dL Final    Comment: This test was developed and its performance characteristics determined by the   [facility].   It has not been cleared or approved by the FDA. The laboratory is regulated   under CLIA as qualified to perform high-complexity testing. This test is used   for clinical purposes. It should not be regarded as investigational or for   research.       Sex Hormone Binding Globulin 05/04/2021 46  11 - 80 nmol/L Final     Free Testosterone Calculated 05/04/2021 6.45  4.7 - 24.4 ng/dL Final     Vitamin D Deficiency screening 05/04/2021 76* 20 - 75 ug/L Final    Comment: Season, race, dietary intake, and treatment affect the concentration of   25-hydroxy-Vitamin D. Values may decrease during winter months and increase   during summer months. Values 20-29 ug/L may indicate Vitamin D insufficiency   and values <20 ug/L may indicate Vitamin D deficiency.  Vitamin D determination is routinely performed by an immunoassay  specific for   25 hydroxyvitamin D3.  If an individual is on vitamin D2 (ergocalciferol)   supplementation, please specify 25 OH vitamin D2 and D3 level determination by   LCMSMS test [id number].       Cholesterol 05/04/2021 209* <200 mg/dL Final    Desirable:       <200 mg/dl     Triglycerides 05/04/2021 61  <150 mg/dL Final     HDL Cholesterol 05/04/2021 87  >39 mg/dL Final     LDL Cholesterol Calculated 05/04/2021 109* <100 mg/dL Final    Comment: Above desirable:  100-129 mg/dl  Borderline High:  130-159 mg/dL  High:             160-189 mg/dL  Very high:       >189 mg/dl       Non HDL Cholesterol 05/04/2021 122  <130 mg/dL Final     Glucose 05/04/2021 103* 70 - 99 mg/dL Final         Video-Visit Details    Type of service:  Video Visit    Video End Time: 12:40pm    Originating Location (pt. Location): Home    Distant Location (provider location):  [facility] [place]     Platform used for Video Visit: [employer]

## 2022-01-06 ENCOUNTER — LAB (OUTPATIENT)
Dept: LAB | Facility: CLINIC | Age: 52
End: 2022-01-06
Attending: NURSE PRACTITIONER
Payer: COMMERCIAL

## 2022-01-06 DIAGNOSIS — R79.89 LOW TESTOSTERONE IN MALE: ICD-10-CM

## 2022-01-06 DIAGNOSIS — Z13.220 SCREENING FOR HYPERLIPIDEMIA: ICD-10-CM

## 2022-01-06 DIAGNOSIS — Z12.5 SCREENING FOR PROSTATE CANCER: ICD-10-CM

## 2022-01-06 DIAGNOSIS — R68.82 LOW LIBIDO: ICD-10-CM

## 2022-01-06 DIAGNOSIS — Z13.1 SCREENING FOR DIABETES MELLITUS: ICD-10-CM

## 2022-01-06 LAB
ALBUMIN SERPL-MCNC: 4.2 G/DL (ref 3.4–5)
ALP SERPL-CCNC: 60 U/L (ref 40–150)
ALT SERPL W P-5'-P-CCNC: 44 U/L (ref 0–70)
ANION GAP SERPL CALCULATED.3IONS-SCNC: 7 MMOL/L (ref 3–14)
AST SERPL W P-5'-P-CCNC: 23 U/L (ref 0–45)
BASOPHILS # BLD AUTO: 0 10E3/UL (ref 0–0.2)
BASOPHILS NFR BLD AUTO: 1 %
BILIRUB SERPL-MCNC: 1.8 MG/DL (ref 0.2–1.3)
BUN SERPL-MCNC: 20 MG/DL (ref 7–30)
CALCIUM SERPL-MCNC: 9.4 MG/DL (ref 8.5–10.1)
CHLORIDE BLD-SCNC: 108 MMOL/L (ref 94–109)
CHOLEST SERPL-MCNC: 201 MG/DL
CO2 SERPL-SCNC: 27 MMOL/L (ref 20–32)
CREAT SERPL-MCNC: 1.23 MG/DL (ref 0.66–1.25)
EOSINOPHIL # BLD AUTO: 0.1 10E3/UL (ref 0–0.7)
EOSINOPHIL NFR BLD AUTO: 2 %
ERYTHROCYTE [DISTWIDTH] IN BLOOD BY AUTOMATED COUNT: 13.2 % (ref 10–15)
FASTING STATUS PATIENT QL REPORTED: ABNORMAL
GFR SERPL CREATININE-BSD FRML MDRD: 71 ML/MIN/1.73M2
GLUCOSE BLD-MCNC: 105 MG/DL (ref 70–99)
HCT VFR BLD AUTO: 41.4 % (ref 40–53)
HDLC SERPL-MCNC: 85 MG/DL
HGB BLD-MCNC: 13.6 G/DL (ref 13.3–17.7)
IMM GRANULOCYTES # BLD: 0 10E3/UL
IMM GRANULOCYTES NFR BLD: 0 %
LDLC SERPL CALC-MCNC: 105 MG/DL
LYMPHOCYTES # BLD AUTO: 2.2 10E3/UL (ref 0.8–5.3)
LYMPHOCYTES NFR BLD AUTO: 53 %
MCH RBC QN AUTO: 30.3 PG (ref 26.5–33)
MCHC RBC AUTO-ENTMCNC: 32.9 G/DL (ref 31.5–36.5)
MCV RBC AUTO: 92 FL (ref 78–100)
MONOCYTES # BLD AUTO: 0.4 10E3/UL (ref 0–1.3)
MONOCYTES NFR BLD AUTO: 10 %
NEUTROPHILS # BLD AUTO: 1.4 10E3/UL (ref 1.6–8.3)
NEUTROPHILS NFR BLD AUTO: 34 %
NONHDLC SERPL-MCNC: 116 MG/DL
NRBC # BLD AUTO: 0 10E3/UL
NRBC BLD AUTO-RTO: 0 /100
PLATELET # BLD AUTO: 139 10E3/UL (ref 150–450)
POTASSIUM BLD-SCNC: 4 MMOL/L (ref 3.4–5.3)
PROT SERPL-MCNC: 7.4 G/DL (ref 6.8–8.8)
PSA SERPL-MCNC: 0.48 UG/L (ref 0–4)
RBC # BLD AUTO: 4.49 10E6/UL (ref 4.4–5.9)
SHBG SERPL-SCNC: 49 NMOL/L (ref 11–80)
SODIUM SERPL-SCNC: 142 MMOL/L (ref 133–144)
TRIGL SERPL-MCNC: 54 MG/DL
WBC # BLD AUTO: 4.1 10E3/UL (ref 4–11)

## 2022-01-06 PROCEDURE — 99000 SPECIMEN HANDLING OFFICE-LAB: CPT | Performed by: PATHOLOGY

## 2022-01-06 PROCEDURE — 80053 COMPREHEN METABOLIC PANEL: CPT | Performed by: PATHOLOGY

## 2022-01-06 PROCEDURE — 36415 COLL VENOUS BLD VENIPUNCTURE: CPT | Performed by: PATHOLOGY

## 2022-01-06 PROCEDURE — 80061 LIPID PANEL: CPT | Performed by: PATHOLOGY

## 2022-01-06 PROCEDURE — G0103 PSA SCREENING: HCPCS | Performed by: PATHOLOGY

## 2022-01-06 PROCEDURE — 84403 ASSAY OF TOTAL TESTOSTERONE: CPT | Mod: 90 | Performed by: PATHOLOGY

## 2022-01-06 PROCEDURE — 85025 COMPLETE CBC W/AUTO DIFF WBC: CPT | Performed by: PATHOLOGY

## 2022-01-06 PROCEDURE — 84270 ASSAY OF SEX HORMONE GLOBUL: CPT | Mod: 90 | Performed by: PATHOLOGY

## 2022-01-08 LAB
TESTOST FREE SERPL-MCNC: 8.07 NG/DL
TESTOST SERPL-MCNC: 498 NG/DL (ref 240–950)

## 2022-01-11 DIAGNOSIS — R17 SERUM TOTAL BILIRUBIN ELEVATED: ICD-10-CM

## 2022-01-11 DIAGNOSIS — D69.6 THROMBOCYTOPENIA (H): Primary | ICD-10-CM

## 2022-03-07 ENCOUNTER — MYC MEDICAL ADVICE (OUTPATIENT)
Dept: FAMILY MEDICINE | Facility: CLINIC | Age: 52
End: 2022-03-07
Payer: COMMERCIAL

## 2022-03-07 DIAGNOSIS — R68.82 LOW LIBIDO: ICD-10-CM

## 2022-03-07 DIAGNOSIS — R17 SERUM TOTAL BILIRUBIN ELEVATED: Primary | ICD-10-CM

## 2022-03-07 DIAGNOSIS — R79.89 LOW TESTOSTERONE IN MALE: ICD-10-CM

## 2022-03-17 ENCOUNTER — TELEPHONE (OUTPATIENT)
Dept: FAMILY MEDICINE | Facility: CLINIC | Age: 52
End: 2022-03-17
Payer: COMMERCIAL

## 2022-03-22 ENCOUNTER — LAB (OUTPATIENT)
Dept: LAB | Facility: CLINIC | Age: 52
End: 2022-03-22
Payer: COMMERCIAL

## 2022-03-22 DIAGNOSIS — R17 SERUM TOTAL BILIRUBIN ELEVATED: ICD-10-CM

## 2022-03-22 DIAGNOSIS — R79.89 LOW TESTOSTERONE IN MALE: ICD-10-CM

## 2022-03-22 LAB
ALBUMIN SERPL-MCNC: 4.1 G/DL (ref 3.4–5)
ALP SERPL-CCNC: 56 U/L (ref 40–150)
ALT SERPL W P-5'-P-CCNC: 34 U/L (ref 0–70)
ANION GAP SERPL CALCULATED.3IONS-SCNC: 5 MMOL/L (ref 3–14)
AST SERPL W P-5'-P-CCNC: 24 U/L (ref 0–45)
BILIRUB SERPL-MCNC: 1.5 MG/DL (ref 0.2–1.3)
BUN SERPL-MCNC: 20 MG/DL (ref 7–30)
CALCIUM SERPL-MCNC: 9 MG/DL (ref 8.5–10.1)
CHLORIDE BLD-SCNC: 107 MMOL/L (ref 94–109)
CO2 SERPL-SCNC: 28 MMOL/L (ref 20–32)
CREAT SERPL-MCNC: 1.33 MG/DL (ref 0.66–1.25)
ERYTHROCYTE [DISTWIDTH] IN BLOOD BY AUTOMATED COUNT: 13.3 % (ref 10–15)
GFR SERPL CREATININE-BSD FRML MDRD: 64 ML/MIN/1.73M2
GLUCOSE BLD-MCNC: 102 MG/DL (ref 70–99)
HCT VFR BLD AUTO: 40 % (ref 40–53)
HGB BLD-MCNC: 13.5 G/DL (ref 13.3–17.7)
MCH RBC QN AUTO: 30.7 PG (ref 26.5–33)
MCHC RBC AUTO-ENTMCNC: 33.8 G/DL (ref 31.5–36.5)
MCV RBC AUTO: 91 FL (ref 78–100)
PLATELET # BLD AUTO: 152 10E3/UL (ref 150–450)
POTASSIUM BLD-SCNC: 3.9 MMOL/L (ref 3.4–5.3)
PROT SERPL-MCNC: 7.5 G/DL (ref 6.8–8.8)
PSA SERPL-MCNC: 0.53 UG/L (ref 0–4)
RBC # BLD AUTO: 4.4 10E6/UL (ref 4.4–5.9)
SODIUM SERPL-SCNC: 140 MMOL/L (ref 133–144)
WBC # BLD AUTO: 3.7 10E3/UL (ref 4–11)

## 2022-03-22 PROCEDURE — 80053 COMPREHEN METABOLIC PANEL: CPT | Performed by: PATHOLOGY

## 2022-03-22 PROCEDURE — 36415 COLL VENOUS BLD VENIPUNCTURE: CPT | Performed by: PATHOLOGY

## 2022-03-22 PROCEDURE — 99000 SPECIMEN HANDLING OFFICE-LAB: CPT | Performed by: PATHOLOGY

## 2022-03-22 PROCEDURE — G0103 PSA SCREENING: HCPCS | Performed by: PATHOLOGY

## 2022-03-22 PROCEDURE — 84403 ASSAY OF TOTAL TESTOSTERONE: CPT | Mod: 90 | Performed by: PATHOLOGY

## 2022-03-22 PROCEDURE — 85027 COMPLETE CBC AUTOMATED: CPT | Performed by: PATHOLOGY

## 2022-03-24 LAB — TESTOST SERPL-MCNC: 406 NG/DL (ref 240–950)

## 2022-05-26 ENCOUNTER — OFFICE VISIT (OUTPATIENT)
Dept: OPHTHALMOLOGY | Facility: CLINIC | Age: 52
End: 2022-05-26
Payer: COMMERCIAL

## 2022-05-26 VITALS — HEIGHT: 70 IN | BODY MASS INDEX: 26.77 KG/M2 | WEIGHT: 187 LBS

## 2022-05-26 DIAGNOSIS — H52.13 MYOPIA OF BOTH EYES: Primary | ICD-10-CM

## 2022-05-26 DIAGNOSIS — H35.3112 INTERMEDIATE STAGE NONEXUDATIVE AGE-RELATED MACULAR DEGENERATION OF RIGHT EYE: ICD-10-CM

## 2022-05-26 PROCEDURE — 92014 COMPRE OPH EXAM EST PT 1/>: CPT | Performed by: OPTOMETRIST

## 2022-05-26 PROCEDURE — 92134 CPTRZ OPH DX IMG PST SGM RTA: CPT | Performed by: OPTOMETRIST

## 2022-05-26 PROCEDURE — 92015 DETERMINE REFRACTIVE STATE: CPT | Performed by: OPTOMETRIST

## 2022-05-26 ASSESSMENT — SLIT LAMP EXAM - LIDS
COMMENTS: NORMAL
COMMENTS: NORMAL

## 2022-05-26 ASSESSMENT — REFRACTION_MANIFEST
OD_ADD: +2.25
OD_AXIS: 042
OS_AXIS: 162
OS_SPHERE: -2.25
OD_CYLINDER: +0.75
OS_ADD: +2.25
OD_SPHERE: -2.50
OS_CYLINDER: +0.75

## 2022-05-26 ASSESSMENT — REFRACTION_WEARINGRX
OD_SPHERE: -2.25
OS_CYLINDER: +0.50
OD_CYLINDER: +0.75
OS_AXIS: 160
OD_AXIS: 036
OS_SPHERE: -2.00
SPECS_TYPE: SVL

## 2022-05-26 ASSESSMENT — VISUAL ACUITY
OD_CC: 20/20
OS_SC: J1+/-1
OS_CC+: -2
METHOD: SNELLEN - LINEAR
OD_SC: J1+/-1
OS_CC: 20/20

## 2022-05-26 ASSESSMENT — EXTERNAL EXAM - RIGHT EYE: OD_EXAM: NORMAL

## 2022-05-26 ASSESSMENT — TONOMETRY
IOP_METHOD: ICARE
OD_IOP_MMHG: 14
OS_IOP_MMHG: 16

## 2022-05-26 ASSESSMENT — CUP TO DISC RATIO
OD_RATIO: 0.5
OS_RATIO: 0.5

## 2022-05-26 ASSESSMENT — CONF VISUAL FIELD
OD_NORMAL: 1
OS_NORMAL: 1
METHOD: COUNTING FINGERS

## 2022-05-26 ASSESSMENT — EXTERNAL EXAM - LEFT EYE: OS_EXAM: NORMAL

## 2022-05-26 NOTE — NURSING NOTE
Chief Complaints and History of Present Illnesses   Patient presents with     COMPREHENSIVE EYE EXAM     Intermediate stage nonexudative age-related macular degeneration of right eye/ Several intermediate drusen right eye, few drusen left eye      Chief Complaint(s) and History of Present Illness(es)     COMPREHENSIVE EYE EXAM     Laterality: both eyes    Associated symptoms: Negative for dryness, eye pain, tearing and discharge    Treatments tried: no treatments    Pain scale: 0/10    Comments: Intermediate stage nonexudative age-related macular degeneration of right eye/ Several intermediate drusen right eye, few drusen left eye               Comments     Vision each eye seems to have remained over the past year.   Did not updated glasses. Seem to be working fine for distance. Takes glasses off for near ( prefers)    KACEY Regan COT 2:28 PM May 26, 2022

## 2022-05-26 NOTE — PROGRESS NOTES
History  HPI     COMPREHENSIVE EYE EXAM     In both eyes.  Associated symptoms include Negative for dryness, eye pain, tearing and discharge.  Treatments tried include no treatments.  Pain was noted as 0/10. Additional comments: Intermediate stage nonexudative age-related macular degeneration of right eye/ Several intermediate drusen right eye, few drusen left eye               Comments     Vision each eye seems to have remained over the past year.   Did not updated glasses. Seem to be working fine for distance. Takes glasses off for near ( prefers)    KACEY Regan COT 2:28 PM May 26, 2022               Last edited by Barb Og COT on 5/26/2022  2:28 PM. (History)          Assessment/Plan  (H52.13) Myopia of both eyes  (primary encounter diagnosis)  Comment: Myopia both eyes, removes glasses for reading  Plan: REFRACTION         Educated patient on condition and clinical findings. Dispensed spectacle prescription for as-needed wear. Monitor annually.    (H35.9031) Intermediate stage nonexudative age-related macular degeneration of right eye  Comment: Findings stable, normal amsler grid both eyes, drusen right eye>> left eye   Plan: OCT Retina Spectralis OU (both eyes)         Recommended continued use of AREDS 2 twice each day. Recommended weekly monitoring with Amsler grid. Monitor annually with OCT.    Return to clinic in 1 year for comprehensive eye exam with macular OCT.    Complete documentation of historical and exam elements from today's encounter can  be found in the full encounter summary report (not reduplicated in this progress  note). I personally obtained the chief complaint(s) and history of present illness. I  confirmed and edited as necessary the review of systems, past medical/surgical  history, family history, social history, and examination findings as documented by  others; and I examined the patient myself. I personally reviewed the relevant tests,  images, and reports as  documented above. I formulated and edited as necessary the  assessment and plan and discussed the findings and management plan with the  patient and family.    oJrdon Viveros OD, FAAO

## 2022-10-22 ENCOUNTER — HEALTH MAINTENANCE LETTER (OUTPATIENT)
Age: 52
End: 2022-10-22

## 2023-04-01 ENCOUNTER — HEALTH MAINTENANCE LETTER (OUTPATIENT)
Age: 53
End: 2023-04-01

## 2023-04-17 ENCOUNTER — MYC MEDICAL ADVICE (OUTPATIENT)
Dept: FAMILY MEDICINE | Facility: CLINIC | Age: 53
End: 2023-04-17
Payer: COMMERCIAL

## 2023-04-17 DIAGNOSIS — L72.3 SEBACEOUS CYST: Primary | ICD-10-CM

## 2023-04-17 NOTE — TELEPHONE ENCOUNTER
[facility]  [id number], [place] [address]  Phone: [phone]  Fax: [phone]

## 2023-04-17 NOTE — TELEPHONE ENCOUNTER
Derm referral faxed to [phone] via rightfax.        Warren Lee CMA ([facility]) at 3:54 PM on 4/17/2023

## 2023-07-28 ENCOUNTER — TELEPHONE (OUTPATIENT)
Dept: OPHTHALMOLOGY | Facility: CLINIC | Age: 53
End: 2023-07-28
Payer: COMMERCIAL

## 2023-08-02 ENCOUNTER — OFFICE VISIT (OUTPATIENT)
Dept: OPHTHALMOLOGY | Facility: CLINIC | Age: 53
End: 2023-08-02
Payer: COMMERCIAL

## 2023-08-02 DIAGNOSIS — H35.3112 INTERMEDIATE STAGE NONEXUDATIVE AGE-RELATED MACULAR DEGENERATION OF RIGHT EYE: Primary | ICD-10-CM

## 2023-08-02 DIAGNOSIS — H47.391 MYELINATED OPTIC NERVE FIBER LAYER, RIGHT: ICD-10-CM

## 2023-08-02 DIAGNOSIS — H52.13 MYOPIA OF BOTH EYES: ICD-10-CM

## 2023-08-02 PROCEDURE — 92015 DETERMINE REFRACTIVE STATE: CPT | Performed by: OPTOMETRIST

## 2023-08-02 PROCEDURE — 92134 CPTRZ OPH DX IMG PST SGM RTA: CPT | Performed by: OPTOMETRIST

## 2023-08-02 PROCEDURE — 92014 COMPRE OPH EXAM EST PT 1/>: CPT | Performed by: OPTOMETRIST

## 2023-08-02 ASSESSMENT — REFRACTION_MANIFEST
OD_ADD: +2.25
OS_ADD: +2.25
OD_SPHERE: -2.50
OD_CYLINDER: +0.75
OS_SPHERE: -2.25
OS_AXIS: 162
OD_AXIS: 162
OS_CYLINDER: +0.75

## 2023-08-02 ASSESSMENT — REFRACTION_WEARINGRX
OS_CYLINDER: +0.50
SPECS_TYPE: SVL
OS_SPHERE: -2.00
OD_AXIS: 036
OD_SPHERE: -2.25
OD_CYLINDER: +0.75
OS_AXIS: 160

## 2023-08-02 ASSESSMENT — VISUAL ACUITY
OD_CC: 20/15
METHOD: SNELLEN - LINEAR
OS_CC: 20/15
CORRECTION_TYPE: GLASSES
OS_CC+: -2

## 2023-08-02 ASSESSMENT — CUP TO DISC RATIO
OS_RATIO: 0.5
OD_RATIO: 0.5

## 2023-08-02 ASSESSMENT — CONF VISUAL FIELD
OS_SUPERIOR_NASAL_RESTRICTION: 0
OS_SUPERIOR_TEMPORAL_RESTRICTION: 0
OD_SUPERIOR_TEMPORAL_RESTRICTION: 0
OS_NORMAL: 1
OD_SUPERIOR_NASAL_RESTRICTION: 0
OD_INFERIOR_NASAL_RESTRICTION: 0
OD_INFERIOR_TEMPORAL_RESTRICTION: 0
METHOD: COUNTING FINGERS
OS_INFERIOR_TEMPORAL_RESTRICTION: 0
OD_NORMAL: 1
OS_INFERIOR_NASAL_RESTRICTION: 0

## 2023-08-02 ASSESSMENT — SLIT LAMP EXAM - LIDS
COMMENTS: NORMAL
COMMENTS: NORMAL

## 2023-08-02 ASSESSMENT — TONOMETRY
IOP_METHOD: ICARE
OS_IOP_MMHG: 11
OD_IOP_MMHG: 14

## 2023-08-02 ASSESSMENT — EXTERNAL EXAM - RIGHT EYE: OD_EXAM: NORMAL

## 2023-08-02 ASSESSMENT — EXTERNAL EXAM - LEFT EYE: OS_EXAM: NORMAL

## 2023-08-02 NOTE — PROGRESS NOTES
History  HPI       Annual Eye Exam    Associated symptoms include Negative for flashes, floaters, itching and burning.  Pain was noted as 0/10.             Comments    Patient present for annual exam. Patient denies any issues at this time.  FARZAD Miramontes August 2, 2023 3:22 PM           Last edited by Nika Sin on 8/2/2023  3:22 PM.          Assessment/Plan  (H35.3542) Intermediate stage nonexudative age-related macular degeneration of right eye  (primary encounter diagnosis)  Comment: Findings stable, drusen right eye>> left eye   Plan: OCT Retina Spectralis OU (both eyes)              Recommended continued use of supplement (patient stated similar ingredients to AREDS 2) twice each day. Monitor annually with OCT.    (H52.13) Myopia of both eyes  Comment: Myopia with presbyopia both eyes   Plan: REFRACTION         Dispensed spectacle prescription for full time wear. Monitor annually.    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 Monitor annually.    Return to clinic in 1 year for comprehensive eye exam.    Complete documentation of historical and exam elements from today's encounter can  be found in the full encounter summary report (not reduplicated in this progress  note). I personally obtained the chief complaint(s) and history of present illness. I  confirmed and edited as necessary the review of systems, past medical/surgical  history, family history, social history, and examination findings as documented by  others; and I examined the patient myself. I personally reviewed the relevant tests,  images, and reports as documented above. I formulated and edited as necessary the  assessment and plan and discussed the findings and management plan with the  patient and family.    Jordon Viveros, PAULETTE, FAAO

## 2023-08-02 NOTE — NURSING NOTE
Chief Complaints and History of Present Illnesses   Patient presents with    Annual Eye Exam     Chief Complaint(s) and History of Present Illness(es)       Annual Eye Exam              Associated symptoms: Negative for flashes, floaters, itching and burning    Pain scale: 0/10              Comments    Patient present for annual exam. Patient denies any issues at this time.  FARZAD Miramontes August 2, 2023 3:22 PM

## 2024-03-12 ENCOUNTER — VIRTUAL VISIT (OUTPATIENT)
Dept: FAMILY MEDICINE | Facility: CLINIC | Age: 54
End: 2024-03-12
Payer: COMMERCIAL

## 2024-03-12 DIAGNOSIS — J06.9 VIRAL URI WITH COUGH: Primary | ICD-10-CM

## 2024-03-12 PROCEDURE — 99213 OFFICE O/P EST LOW 20 MIN: CPT | Mod: 95 | Performed by: FAMILY MEDICINE

## 2024-03-12 ASSESSMENT — ENCOUNTER SYMPTOMS
SORE THROAT: 1
COUGH: 1

## 2024-03-12 NOTE — PROGRESS NOTES
Pantera is a 53 year old who is being evaluated via a billable video visit.      How would you like to obtain your AVS? Smart[employer]Card  If the video visit is dropped, the invitation should be resent by: Send to e-mail at: [email or web address]  Will anyone else be joining your video visit? No              Instructions Relayed to Patient by Virtual Roomer:       Reminded patient to ensure they were logged on to virtual visit by arrival time listed. Documented in appointment notes if patient had flexibility to initiate visit sooner than arrival time. If pediatric virtual visit, ensured pediatric patient along with parent/guardian will be present for video visit.     Patient offered the website [email or web address] and/or phone number to [employer] Help line: [phone]     Assessment & Plan     (J06.9) Viral URI with cough  (primary encounter diagnosis)  Comment: Afebrile with simple symptoms. The patient assumes he does not have COVID-19.   Plan: Discussed various OTC products that are appropriate for his various symptoms. He also received information about through Smart[employer] earlier today. Return in about 6 days (around 3/18/2024) for recheck if symptoms fail to resolve by then, in the office.        16 minutes spent by me on the date of the encounter doing chart review, patient visit, and documentation             Subjective   Pantera is a 53 year old, presenting for the following health issues:  Pharyngitis and Cough      3/12/2024     9:34 AM   Additional Questions   Roomed by Faith         3/12/2024     9:34 AM   Patient Reported Additional Medications   Patient reports taking the following new medications none     History of Present Illness       Reason for visit:  Sore throat  Symptom onset:  3-7 days ago  Symptoms include:  Cough, sore throat, mucus, migraine  Symptom intensity:  Moderate  Symptom progression:  Staying the same  Had these symptoms before:  Yes  Has tried/received treatment for these  symptoms:  No  What makes it worse:  No  What makes it better:  No    He eats 2-3 servings of fruits and vegetables daily.He consumes 0 sweetened beverage(s) daily.He exercises with enough effort to increase his heart rate 20 to 29 minutes per day.  He exercises with enough effort to increase his heart rate 3 or less days per week.   He is taking medications regularly.    Patient reports his symptoms started Friday (3/8). He continues to have persistent sore throat and cough with yellow mucus. Patient notes difficulty sleeping the last few nights due to the sore throat. He tried gargling with warm water and baking soda with no relief. Patient denies any fever. He didn't test himself for COVID-19 yet, but notes his kids and wife had a recent viral URI.     Review of Systems   HENT:  Positive for sore throat.    Respiratory:  Positive for cough.           Objective           Vitals:  No vitals were obtained today due to virtual visit.    Physical Exam   GENERAL: alert and no distress  EYES: Eyes grossly normal to inspection.  No discharge or erythema, or obvious scleral/conjunctival abnormalities.  RESP: No audible wheeze, cough, or visible cyanosis.    SKIN: Visible skin clear. No significant rash, abnormal pigmentation or lesions.  NEURO: Cranial nerves grossly intact.  Mentation and speech appropriate for age.  PSYCH: Appropriate affect, tone, and pace of words          Video-Visit Details    Type of service:  Video Visit     Originating Location (pt. Location): Home  Total video time: 13 minutes  Distant Location (provider location):  On-site  Platform used for Video Visit: Jim      Signed Electronically by: Maco Melgar MD      The information in this document, created by the medical scribe for me, accurately reflects the services I personally performed and the decisions made by me. I have reviewed and approved this document for accuracy prior to signature.  Velia Cheng

## 2024-06-02 ENCOUNTER — HEALTH MAINTENANCE LETTER (OUTPATIENT)
Age: 54
End: 2024-06-02

## 2024-07-25 ENCOUNTER — TELEPHONE (OUTPATIENT)
Dept: FAMILY MEDICINE | Facility: CLINIC | Age: 54
End: 2024-07-25
Payer: COMMERCIAL

## 2024-07-25 NOTE — TELEPHONE ENCOUNTER
Patient Quality Outreach    Patient is due for the following:   Physical Preventive Adult Physical    Next Steps:   Schedule a Adult Preventative    Type of outreach:    Sent EasyPost message.    Next Steps:  Reach out within 90 days via EasyPost.    Max number of attempts reached: Yes. Will try again in 90 days if patient still on fail list.    Questions for provider review:    None           Alena Zepeda MA

## 2024-09-05 ENCOUNTER — OFFICE VISIT (OUTPATIENT)
Dept: OPHTHALMOLOGY | Facility: CLINIC | Age: 54
End: 2024-09-05
Payer: COMMERCIAL

## 2024-09-05 DIAGNOSIS — H35.3112 INTERMEDIATE STAGE NONEXUDATIVE AGE-RELATED MACULAR DEGENERATION OF RIGHT EYE: Primary | ICD-10-CM

## 2024-09-05 DIAGNOSIS — H47.391 MYELINATED OPTIC NERVE FIBER LAYER, RIGHT: ICD-10-CM

## 2024-09-05 DIAGNOSIS — H52.13 MYOPIA OF BOTH EYES: ICD-10-CM

## 2024-09-05 PROCEDURE — 92015 DETERMINE REFRACTIVE STATE: CPT | Performed by: OPTOMETRIST

## 2024-09-05 PROCEDURE — 92014 COMPRE OPH EXAM EST PT 1/>: CPT | Performed by: OPTOMETRIST

## 2024-09-05 PROCEDURE — 92134 CPTRZ OPH DX IMG PST SGM RTA: CPT | Performed by: OPTOMETRIST

## 2024-09-05 ASSESSMENT — REFRACTION_WEARINGRX
OS_ADD: +2.25
OD_SPHERE: -2.50
OD_AXIS: 162
OS_AXIS: 162
OS_CYLINDER: +0.75
OS_SPHERE: -2.25
OD_CYLINDER: +0.75
OD_ADD: +2.25

## 2024-09-05 ASSESSMENT — REFRACTION_MANIFEST
OD_ADD: +2.25
OS_SPHERE: -2.00
OD_SPHERE: -2.25
OS_ADD: +2.25
OS_CYLINDER: +1.00
OS_AXIS: 168
OD_CYLINDER: +1.00
OD_AXIS: 050

## 2024-09-05 ASSESSMENT — CONF VISUAL FIELD
OS_INFERIOR_TEMPORAL_RESTRICTION: 0
OS_SUPERIOR_NASAL_RESTRICTION: 0
OD_SUPERIOR_TEMPORAL_RESTRICTION: 0
OS_SUPERIOR_TEMPORAL_RESTRICTION: 0
OD_NORMAL: 1
OS_INFERIOR_NASAL_RESTRICTION: 0
OD_INFERIOR_TEMPORAL_RESTRICTION: 0
METHOD: COUNTING FINGERS
OD_INFERIOR_NASAL_RESTRICTION: 0
OD_SUPERIOR_NASAL_RESTRICTION: 0
OS_NORMAL: 1

## 2024-09-05 ASSESSMENT — TONOMETRY
OD_IOP_MMHG: 17
OS_IOP_MMHG: 19
IOP_METHOD: ICARE

## 2024-09-05 ASSESSMENT — VISUAL ACUITY
OD_SC: J1+
OD_CC: 20/25
METHOD: SNELLEN - LINEAR
OS_SC: J1+
CORRECTION_TYPE: GLASSES
OS_CC: 20/20

## 2024-09-05 ASSESSMENT — CUP TO DISC RATIO
OS_RATIO: 0.6
OD_RATIO: 0.5

## 2024-09-05 ASSESSMENT — SLIT LAMP EXAM - LIDS
COMMENTS: NORMAL
COMMENTS: NORMAL

## 2024-09-05 ASSESSMENT — EXTERNAL EXAM - LEFT EYE: OS_EXAM: NORMAL

## 2024-09-05 ASSESSMENT — EXTERNAL EXAM - RIGHT EYE: OD_EXAM: NORMAL

## 2024-09-05 NOTE — NURSING NOTE
Chief Complaints and History of Present Illnesses   Patient presents with    COMPREHENSIVE EYE EXAM     Comprehensive exam      Chief Complaint(s) and History of Present Illness(es)       COMPREHENSIVE EYE EXAM              Laterality: both eyes    Treatments tried: no treatments    Pain scale: 0/10    Comments: Comprehensive exam               Comments    Vision remains stable with each eye over the past year.     Sunglasses working fine distance.   Not using anything for near and this is fine as well.     Barb Og, COT COT 3:32 PM September 5, 2024

## 2024-09-05 NOTE — PROGRESS NOTES
History  HPI       COMPREHENSIVE EYE EXAM    In both eyes.  Treatments tried include no treatments.  Pain was noted as 0/10. Additional comments: Comprehensive exam              Comments    Vision remains stable with each eye over the past year.     Sunglasses working fine distance.   Not using anything for near and this is fine as well.     KACEY Regan COT 3:32 PM September 5, 2024               Last edited by Barb Og COT on 9/5/2024  3:32 PM.          Assessment/Plan  (H35.3112) Intermediate stage nonexudative age-related macular degeneration of right eye  (primary encounter diagnosis)  Comment: Findings stable, drusen right eye>> left eye   Plan: OCT Retina Spectralis OU (both eyes)              Recommended continued use of supplement (patient stated similar ingredients to AREDS 2) twice each day. Monitor annually with OCT.    (H52.13) Myopia of both eyes  Comment: Myopic astigmatism with presbyopia  Plan: REFRACTION         Dispensed spectacle prescription for full time wear. Monitor annually.    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    Return to clinic in 1 year for comprehensive eye exam.    Complete documentation of historical and exam elements from today's encounter can  be found in the full encounter summary report (not reduplicated in this progress  note). I personally obtained the chief complaint(s) and history of present illness. I  confirmed and edited as necessary the review of systems, past medical/surgical  history, family history, social history, and examination findings as documented by  others; and I examined the patient myself. I personally reviewed the relevant tests,  images, and reports as documented above. I formulated and edited as necessary the  assessment and plan and discussed the findings and management plan with the  patient and family.    Jordon Viveros, OD, FAAO

## 2025-04-03 ENCOUNTER — MYC MEDICAL ADVICE (OUTPATIENT)
Dept: FAMILY MEDICINE | Facility: CLINIC | Age: 55
End: 2025-04-03
Payer: COMMERCIAL

## 2025-04-03 DIAGNOSIS — L98.9 SKIN LESION: Primary | ICD-10-CM

## 2025-06-15 ENCOUNTER — HEALTH MAINTENANCE LETTER (OUTPATIENT)
Age: 55
End: 2025-06-15